# Patient Record
Sex: FEMALE | Race: WHITE | HISPANIC OR LATINO | Employment: FULL TIME | URBAN - METROPOLITAN AREA
[De-identification: names, ages, dates, MRNs, and addresses within clinical notes are randomized per-mention and may not be internally consistent; named-entity substitution may affect disease eponyms.]

---

## 2020-11-17 ENCOUNTER — TELEPHONE (OUTPATIENT)
Dept: OBGYN | Facility: CLINIC | Age: 30
End: 2020-11-17

## 2020-11-17 NOTE — TELEPHONE ENCOUNTER
Called patient to see if she had medical records ready for her appointment because we will be needing them day of because she is transfer of care.

## 2020-11-24 ENCOUNTER — INITIAL PRENATAL (OUTPATIENT)
Dept: OBGYN | Facility: CLINIC | Age: 30
End: 2020-11-24
Payer: COMMERCIAL

## 2020-11-24 ENCOUNTER — HOSPITAL ENCOUNTER (OUTPATIENT)
Facility: MEDICAL CENTER | Age: 30
End: 2020-11-24
Attending: OBSTETRICS & GYNECOLOGY
Payer: COMMERCIAL

## 2020-11-24 ENCOUNTER — HOSPITAL ENCOUNTER (OUTPATIENT)
Dept: RADIOLOGY | Facility: MEDICAL CENTER | Age: 30
End: 2020-11-24
Attending: OBSTETRICS & GYNECOLOGY
Payer: COMMERCIAL

## 2020-11-24 VITALS — SYSTOLIC BLOOD PRESSURE: 100 MMHG | WEIGHT: 174 LBS | DIASTOLIC BLOOD PRESSURE: 66 MMHG | BODY MASS INDEX: 31.83 KG/M2

## 2020-11-24 DIAGNOSIS — O09.92 SUPERVISION OF HIGH RISK PREGNANCY IN SECOND TRIMESTER: ICD-10-CM

## 2020-11-24 PROCEDURE — 59401 PR NEW OB VISIT: CPT | Performed by: OBSTETRICS & GYNECOLOGY

## 2020-11-24 PROCEDURE — 76805 OB US >/= 14 WKS SNGL FETUS: CPT

## 2020-11-24 PROCEDURE — 87591 N.GONORRHOEAE DNA AMP PROB: CPT

## 2020-11-24 PROCEDURE — 87491 CHLMYD TRACH DNA AMP PROBE: CPT

## 2020-11-24 RX ORDER — ALBUTEROL SULFATE 90 UG/1
2 AEROSOL, METERED RESPIRATORY (INHALATION) EVERY 6 HOURS PRN
COMMUNITY

## 2020-11-24 ASSESSMENT — EDINBURGH POSTNATAL DEPRESSION SCALE (EPDS)
I HAVE BEEN SO UNHAPPY THAT I HAVE BEEN CRYING: ONLY OCCASIONALLY
I HAVE BEEN ANXIOUS OR WORRIED FOR NO GOOD REASON: YES, VERY OFTEN
I HAVE FELT SAD OR MISERABLE: YES, QUITE OFTEN
THE THOUGHT OF HARMING MYSELF HAS OCCURRED TO ME: HARDLY EVER
I HAVE BEEN SO UNHAPPY THAT I HAVE HAD DIFFICULTY SLEEPING: NOT VERY OFTEN
TOTAL SCORE: 13
I HAVE BLAMED MYSELF UNNECESSARILY WHEN THINGS WENT WRONG: NOT VERY OFTEN
I HAVE BEEN ABLE TO LAUGH AND SEE THE FUNNY SIDE OF THINGS: AS MUCH AS I ALWAYS COULD
THINGS HAVE BEEN GETTING ON TOP OF ME: YES, SOMETIMES I HAVEN'T BEEN COPING AS WELL AS USUAL
I HAVE LOOKED FORWARD WITH ENJOYMENT TO THINGS: AS MUCH AS I EVER DID
I HAVE FELT SCARED OR PANICKY FOR NO GOOD REASON: YES, SOMETIMES

## 2020-11-24 NOTE — PROGRESS NOTES
"Cc: New OB visit    Ms. Salgado is a 30 y.o.  at 24w4d with  Patient's last menstrual period was 2020. c/w 9w US (in [place].  First knew she was pregnant on  when she missed her menses and started to feel nauseous.  She presents today for her new obstetric visit.  Reports she has been doing well now.    She reports a lot of fetal movement, denies vaginal bleeding, denies leakage of fluid, denies contractions.     She denies nausea/vomiting, headaches, or urinary symptoms.        Dating:LMP c/w outside 9w US    GYN History:  LMP 20. Menarche @11.  Menses irregular, lasting 6-7 days, not particularly heavy.  Last pap , no h/o abnormal pap, nor history of cone biopsy, LEEP or any other cervical, uterine or gynecologic surgery. No history of sexually transmitted diseases.  Has used Depo and Nexplanon in the past    OB History    Para Term  AB Living   2 1 1         SAB TAB Ectopic Molar Multiple Live Births                    # Outcome Date GA Lbr Fredrick/2nd Weight Sex Delivery Anes PTL Lv   2 Current            1 Term 12   3.402 kg (7 lb 8 oz) F CS-Classical Spinal N       Complications: Heart disease     Past Medical History:   Diagnosis Date   • Allergy     seasonal allergys.    • Anemia    • Asthma for \"all her life\", stable on advair and uses rescue albuterol inhaler PRN 10/4/2011    Last Attack, 12yrs old   • Eczema     dx as a child.    • Urinary tract infection, site not specified          Past Surgical History:   Procedure Laterality Date   • OTHER ABDOMINAL SURGERY  2012    lap choly   • HENNY BY LAPAROSCOPY  2012    Performed by ERINN JANG at SURGERY [facility] ORS   • PRIMARY C SECTION  2012    Performed by THALIA JOY at LABOR AND DELIVERY   • EYE SURGERY      7 yrs old, Ear surgery as well at years old     Social History     Socioeconomic History   • Marital status:      Spouse name: Not on file   • Number of children: Not on file   • " Years of education: Not on file   • Highest education level: Not on file   Occupational History   • Not on file   Social Needs   • Financial resource strain: Not on file   • Food insecurity     Worry: Not on file     Inability: Not on file   • Transportation needs     Medical: Not on file     Non-medical: Not on file   Tobacco Use   • Smoking status: Former Smoker     Years: 1.00     Types: Cigarettes     Quit date: 2011     Years since quittin.5   • Tobacco comment: quit, used to smoke about 3 cigs a day.    Substance and Sexual Activity   • Alcohol use: Yes     Comment: on occations partys/ weekends.   • Drug use: No   • Sexual activity: Yes     Partners: Male     Birth control/protection: Injection   Lifestyle   • Physical activity     Days per week: Not on file     Minutes per session: Not on file   • Stress: Not on file   Relationships   • Social connections     Talks on phone: Not on file     Gets together: Not on file     Attends Evangelical service: Not on file     Active member of club or organization: Not on file     Attends meetings of clubs or organizations: Not on file     Relationship status: Not on file   • Intimate partner violence     Fear of current or ex partner: Not on file     Emotionally abused: Not on file     Physically abused: Not on file     Forced sexual activity: Not on file   Other Topics Concern   • Not on file   Social History Narrative   • Not on file     Family History   Problem Relation Age of Onset   • Psychiatric Illness Mother         Bipolar   • Heart Failure Maternal Grandmother      Allergies:   Allergies as of 2020 - Reviewed 2020   Allergen Reaction Noted   • Food  2012   • Pcn [penicillins]  10/04/2011     PE:    /66   Wt 78.9 kg (174 lb)     General: well developed, well nourished in no apparent distress  Head: normocephalic, atraumatic  Neck: neck is supple, non-tender, no thyromegaly, full range of motion  CVS: regular rate and rhythm, 1+  pitting b/l LE peripheral edema  Lungs: Normal respiratory effort. Clear to auscultation bilaterally  Abdomen: Bowel sounds positive, nondistended, soft, nontender x4, no rebound or guarding.  FH 23,   Female GYN: normal female external genitalia without lesionsnormal vagina and normal vaginal tone  Skin: No rashes, or ulcers or lesions seen  Psychiatric: appropriate affect, is alert and oriented x3, intact judgment and insight.    A/P:  30 y.o.  Unknown based upon  Patient's last menstrual period was 2020..  She is here for her new obstetric visit.    No diagnosis found.    #Prenatal care.  Patient was oriented to our obstetric practice and frequency of visits is discussed.  She is encouraged to continue prenatal vitamin use.  We reviewed safe foods and appropraite exercise during pregnancy.  --Prenatal labs anatomy ultrasound is ordered today  #Mild extremity edema in pregnancy.  Discussed mitigating factors and answered questions.  #History of prior  section.  Patient desires repeat section to be scheduled as she does live remote from hospital.  #NOB panel ordered.  #NOB packet given  #SAB precautions reviewed.  #Return to clinic in 4 weeks.  GAEL

## 2020-11-25 ENCOUNTER — TELEPHONE (OUTPATIENT)
Dept: OBGYN | Facility: CLINIC | Age: 30
End: 2020-11-25

## 2020-11-25 DIAGNOSIS — O09.92 SUPERVISION OF HIGH RISK PREGNANCY IN SECOND TRIMESTER: ICD-10-CM

## 2020-11-26 LAB
C TRACH DNA SPEC QL NAA+PROBE: NEGATIVE
N GONORRHOEA DNA SPEC QL NAA+PROBE: NEGATIVE
SPECIMEN SOURCE: NORMAL

## 2020-11-30 DIAGNOSIS — O36.5911 IUGR (INTRAUTERINE GROWTH RESTRICTION) AFFECTING CARE OF MOTHER, FIRST TRIMESTER, FETUS 1: ICD-10-CM

## 2020-11-30 DIAGNOSIS — O36.8990 FETAL PERICARDIAL EFFUSION AFFECTING MANAGEMENT OF MOTHER: ICD-10-CM

## 2020-12-29 ENCOUNTER — ROUTINE PRENATAL (OUTPATIENT)
Dept: OBGYN | Facility: CLINIC | Age: 30
End: 2020-12-29
Payer: COMMERCIAL

## 2020-12-29 VITALS — BODY MASS INDEX: 32.74 KG/M2 | WEIGHT: 179 LBS | SYSTOLIC BLOOD PRESSURE: 109 MMHG | DIASTOLIC BLOOD PRESSURE: 55 MMHG

## 2020-12-29 DIAGNOSIS — O36.5990 POOR FETAL GROWTH AFFECTING MANAGEMENT OF MOTHER, ANTEPARTUM, SINGLE OR UNSPECIFIED FETUS: ICD-10-CM

## 2020-12-29 DIAGNOSIS — O36.5930 POOR FETAL GROWTH AFFECTING MANAGEMENT OF MOTHER IN THIRD TRIMESTER, SINGLE OR UNSPECIFIED FETUS: ICD-10-CM

## 2020-12-29 DIAGNOSIS — O35.BXX0 ANOMALY OF HEART OF FETUS AFFECTING PREGNANCY, ANTEPARTUM, SINGLE OR UNSPECIFIED FETUS: ICD-10-CM

## 2020-12-29 DIAGNOSIS — Z34.90 PREGNANCY, UNSPECIFIED GESTATIONAL AGE: ICD-10-CM

## 2020-12-29 DIAGNOSIS — I31.39 PERICARDIAL EFFUSION: ICD-10-CM

## 2020-12-29 PROCEDURE — 90040 PR PRENATAL FOLLOW UP: CPT | Performed by: OBSTETRICS & GYNECOLOGY

## 2020-12-29 RX ORDER — DIAPER,BRIEF,INFANT-TODD,DISP
EACH MISCELLANEOUS
COMMUNITY
Start: 2020-12-03 | End: 2021-03-03

## 2020-12-29 RX ORDER — NORGESTIMATE AND ETHINYL ESTRADIOL 0.25-0.035
1 KIT ORAL DAILY
COMMUNITY
End: 2021-03-03

## 2020-12-29 RX ORDER — ALBUTEROL SULFATE 90 UG/1
2 AEROSOL, METERED RESPIRATORY (INHALATION)
COMMUNITY
Start: 2020-08-20 | End: 2021-03-03

## 2020-12-29 RX ORDER — ALBUTEROL SULFATE 2.5 MG/3ML
SOLUTION RESPIRATORY (INHALATION)
COMMUNITY
Start: 2020-12-07

## 2020-12-29 NOTE — PROGRESS NOTES
MADI:  29w    Pt reports doing well.  Denies vaginal bleeding, contractions, LOF.  Reports +FM.    /55   Wt 81.2 kg (179 lb)   LMP 2020   BMI 32.74 kg/m²   gen: AAO, NAD  FHTs: 135  FH: 30    A/P: 30 y.o.  @   Transfer of care from [facility].  --Patient lives remote from [place] (4 hours away)  --h/o prior CS, desires scheduled repeat  --don't yet have prenatal labs - calling clinic to again ask they be sent, they have also been reordered but patient hasn't gone to lab  US 2020: Anterior placenta, PINKY 10, cervical length 2.2, normal anatomy except feet not well visualized and small pericardial effusion,  g 9th percentile  [ ] [facility] consultation for echo and repeat ultrasound sent -patient has not yet gone as she lives 4 hours away and therefore it is very difficult for her to have extra appointments - If hasn't had this appt by next appt will order repeat growth US with radiology  --will need NSTs if again FGR seen on US  [x] 3rd tri labs ordered  [ ] tdap/flu refused but she is willing to be reasked

## 2020-12-29 NOTE — PROGRESS NOTES
Pt here today for OB follow up @ 29w4d  Pt states denies VB and LOF  Reports +FM  Good # [phone]  Pharmacy Confirmed.  MAREK sheet given   declined tdap

## 2021-01-20 ENCOUNTER — ROUTINE PRENATAL (OUTPATIENT)
Dept: OBGYN | Facility: CLINIC | Age: 31
End: 2021-01-20
Payer: COMMERCIAL

## 2021-01-20 ENCOUNTER — HOSPITAL ENCOUNTER (OUTPATIENT)
Dept: LAB | Facility: MEDICAL CENTER | Age: 31
End: 2021-01-20
Attending: OBSTETRICS & GYNECOLOGY
Payer: COMMERCIAL

## 2021-01-20 VITALS — BODY MASS INDEX: 34.57 KG/M2 | WEIGHT: 189 LBS | DIASTOLIC BLOOD PRESSURE: 62 MMHG | SYSTOLIC BLOOD PRESSURE: 102 MMHG

## 2021-01-20 DIAGNOSIS — Z98.891 HX OF CESAREAN SECTION: ICD-10-CM

## 2021-01-20 DIAGNOSIS — O36.5930 POOR FETAL GROWTH AFFECTING MANAGEMENT OF MOTHER IN THIRD TRIMESTER, SINGLE OR UNSPECIFIED FETUS: ICD-10-CM

## 2021-01-20 DIAGNOSIS — Z34.90 PREGNANCY, UNSPECIFIED GESTATIONAL AGE: ICD-10-CM

## 2021-01-20 LAB
ERYTHROCYTE [DISTWIDTH] IN BLOOD BY AUTOMATED COUNT: 42.5 FL (ref 35.9–50)
HCT VFR BLD AUTO: 32.2 % (ref 37–47)
HGB BLD-MCNC: 10.2 G/DL (ref 12–16)
MCH RBC QN AUTO: 29.7 PG (ref 27–33)
MCHC RBC AUTO-ENTMCNC: 31.7 G/DL (ref 33.6–35)
MCV RBC AUTO: 93.6 FL (ref 81.4–97.8)
PLATELET # BLD AUTO: 310 K/UL (ref 164–446)
PMV BLD AUTO: 10.9 FL (ref 9–12.9)
RBC # BLD AUTO: 3.44 M/UL (ref 4.2–5.4)
TREPONEMA PALLIDUM IGG+IGM AB [PRESENCE] IN SERUM OR PLASMA BY IMMUNOASSAY: NORMAL
WBC # BLD AUTO: 10.9 K/UL (ref 4.8–10.8)

## 2021-01-20 PROCEDURE — 85027 COMPLETE CBC AUTOMATED: CPT

## 2021-01-20 PROCEDURE — 86780 TREPONEMA PALLIDUM: CPT

## 2021-01-20 PROCEDURE — 90040 PR PRENATAL FOLLOW UP: CPT | Performed by: OBSTETRICS & GYNECOLOGY

## 2021-01-20 PROCEDURE — 82950 GLUCOSE TEST: CPT

## 2021-01-20 PROCEDURE — 36415 COLL VENOUS BLD VENIPUNCTURE: CPT

## 2021-01-20 NOTE — LETTER
January 20, 2021            Shakila Salgado is currently being cared for at [facility].  This patient is pregnant and may continue to work.  However, she has a high risk pregnancy and cannot sit for longer than 2 hours.  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         Thank you,          Cindy Woods D.O.

## 2021-01-20 NOTE — PROGRESS NOTES
Pt here today for OB follow up  Pt states no VB or LOF   Reports +FM   Good # [phone]   Pharmacy Confirmed.

## 2021-01-21 LAB — GLUCOSE 1H P 50 G GLC PO SERPL-MCNC: 78 MG/DL (ref 70–139)

## 2021-01-25 DIAGNOSIS — O99.013 ANEMIA DURING PREGNANCY IN THIRD TRIMESTER: ICD-10-CM

## 2021-01-26 ENCOUNTER — TELEPHONE (OUTPATIENT)
Dept: OBGYN | Facility: CLINIC | Age: 31
End: 2021-01-26

## 2021-01-26 NOTE — TELEPHONE ENCOUNTER
01/26/21 2:19 PM    LM for pt to call back in regards to some additional labs ordered from Dr. Ha.

## 2021-01-27 ENCOUNTER — APPOINTMENT (OUTPATIENT)
Dept: RADIOLOGY | Facility: MEDICAL CENTER | Age: 31
End: 2021-01-27
Attending: OBSTETRICS & GYNECOLOGY
Payer: COMMERCIAL

## 2021-01-27 NOTE — TELEPHONE ENCOUNTER
Pt called back regarding voicemail. Informed Pt that the voicemail was that  added other labs for her to get done. Pt wanted to know why. Informed PT that it is additional labs due to low iron count from previous labs. Pt understood. Pt also mentioned she would just like to be seen at [address] Pt informed it is nessary to be seen at [facility] as well. Pt understood no further questions asked. Transferred to  for scheduling

## 2021-01-28 ENCOUNTER — ROUTINE PRENATAL (OUTPATIENT)
Dept: OBGYN | Facility: CLINIC | Age: 31
End: 2021-01-28
Payer: COMMERCIAL

## 2021-01-28 ENCOUNTER — HOSPITAL ENCOUNTER (OUTPATIENT)
Dept: RADIOLOGY | Facility: MEDICAL CENTER | Age: 31
End: 2021-01-28
Attending: OBSTETRICS & GYNECOLOGY
Payer: COMMERCIAL

## 2021-01-28 ENCOUNTER — HOSPITAL ENCOUNTER (OUTPATIENT)
Facility: MEDICAL CENTER | Age: 31
End: 2021-01-28
Attending: OBSTETRICS & GYNECOLOGY | Admitting: OBSTETRICS & GYNECOLOGY
Payer: COMMERCIAL

## 2021-01-28 VITALS — BODY MASS INDEX: 31.49 KG/M2 | HEIGHT: 65 IN | WEIGHT: 189 LBS

## 2021-01-28 DIAGNOSIS — O36.5930 POOR FETAL GROWTH AFFECTING MANAGEMENT OF MOTHER IN THIRD TRIMESTER, SINGLE OR UNSPECIFIED FETUS: ICD-10-CM

## 2021-01-28 DIAGNOSIS — O36.5930 POOR FETAL GROWTH AFFECTING MANAGEMENT OF MOTHER IN THIRD TRIMESTER, SINGLE OR UNSPECIFIED FETUS: Primary | ICD-10-CM

## 2021-01-28 PROCEDURE — 76816 OB US FOLLOW-UP PER FETUS: CPT

## 2021-01-28 PROCEDURE — 99213 OFFICE O/P EST LOW 20 MIN: CPT | Performed by: OBSTETRICS & GYNECOLOGY

## 2021-01-28 PROCEDURE — 59025 FETAL NON-STRESS TEST: CPT | Performed by: OBSTETRICS & GYNECOLOGY

## 2021-01-28 PROCEDURE — 90040 PR PRENATAL FOLLOW UP: CPT | Mod: 25 | Performed by: OBSTETRICS & GYNECOLOGY

## 2021-01-28 PROCEDURE — 700111 HCHG RX REV CODE 636 W/ 250 OVERRIDE (IP): Performed by: OBSTETRICS & GYNECOLOGY

## 2021-01-28 PROCEDURE — 96372 THER/PROPH/DIAG INJ SC/IM: CPT

## 2021-01-28 RX ORDER — BETAMETHASONE SODIUM PHOSPHATE AND BETAMETHASONE ACETATE 3; 3 MG/ML; MG/ML
12 INJECTION, SUSPENSION INTRA-ARTICULAR; INTRALESIONAL; INTRAMUSCULAR; SOFT TISSUE ONCE
Qty: 2 ML | Refills: 0 | Status: SHIPPED | OUTPATIENT
Start: 2021-01-28 | End: 2021-01-28

## 2021-01-28 RX ORDER — BETAMETHASONE SODIUM PHOSPHATE AND BETAMETHASONE ACETATE 3; 3 MG/ML; MG/ML
12 INJECTION, SUSPENSION INTRA-ARTICULAR; INTRALESIONAL; INTRAMUSCULAR; SOFT TISSUE ONCE
Status: COMPLETED | OUTPATIENT
Start: 2021-01-28 | End: 2021-01-28

## 2021-01-28 RX ADMIN — BETAMETHASONE ACETATE AND BETAMETHASONE SODIUM PHOSPHATE 12 MG: 3; 3 INJECTION, SUSPENSION INTRA-ARTICULAR; INTRALESIONAL; INTRAMUSCULAR; SOFT TISSUE at 17:48

## 2021-01-28 NOTE — PROGRESS NOTES
S: Pt presents for routine OB follow up.  No contractions, vaginal bleeding, or leaking fluids. Good fetal movement.    Questions answered.    O: /62   Wt 85.7 kg (189 lb)   LMP 2020   BMI 34.57 kg/m²   Patients' weight gain, fluid intake and exercise level discussed.  Vitals, fundal height , fetal position, and FHR reviewed on flowsheet    Lab:  Recent Results (from the past 336 hour(s))   CBC WITHOUT DIFFERENTIAL    Collection Time: 21  3:19 PM   Result Value Ref Range    WBC 10.9 (H) 4.8 - 10.8 K/uL    RBC 3.44 (L) 4.20 - 5.40 M/uL    Hemoglobin 10.2 (L) 12.0 - 16.0 g/dL    Hematocrit 32.2 (L) 37.0 - 47.0 %    MCV 93.6 81.4 - 97.8 fL    MCH 29.7 27.0 - 33.0 pg    MCHC 31.7 (L) 33.6 - 35.0 g/dL    RDW 42.5 35.9 - 50.0 fL    Platelet Count 310 164 - 446 K/uL    MPV 10.9 9.0 - 12.9 fL   T.PALLIDUM AB EIA    Collection Time: 21  3:19 PM   Result Value Ref Range    Syphilis, Treponemal Qual Non-Reactive Non-Reactive   GLUCOSE 1HR GESTATIONAL    Collection Time: 21  3:20 PM   Result Value Ref Range    Glucose, Post Dose 78 70 - 139 mg/dL       A/P:  30 y.o.  at 33w6d presents for routine obstetric follow-up.  Size less than dates    Transfer of care from [facility].  --Patient lives remote from [place] (4 hours away)  --h/o prior CS, desires scheduled repeat possible BTL  --don't yet have prenatal labs - calling clinic to again ask they be sent, they have also been reordered but patient hasn't gone to lab  US 2020: Anterior placenta, PINKY 10, cervical length 2.2, normal anatomy except feet not well visualized and small pericardial effusion,  g 9th percentile  [ ] [facility] consultation for echo and repeat ultrasound sent -patient has not yet gone as she lives 4 hours away and therefore it is very difficult for her to have extra appointments - If hasn't had this appt by next appt will order repeat growth US with radiology   Second referral sent for [facility]   will need  NSTs if again FGR seen on US  [x] 3rd tri labs ordered  [ ] tdap/flu refused but she is willing to be reasked

## 2021-01-29 ENCOUNTER — TELEPHONE (OUTPATIENT)
Dept: OBGYN | Facility: CLINIC | Age: 31
End: 2021-01-29

## 2021-01-29 NOTE — PROGRESS NOTES
Pt presents to L&D for betamethasone injection. Pt was seen in office today for an NST and ultrasound. Dr. Ha aware of pt's arrival, do not need to put pt on monitor, can order injection and then discharge home    Betamethasone administered. Pt discharged home, ambulatory and undelivered. Provided general instructions, PTL precautions and kick count instruction

## 2021-01-29 NOTE — ED PROVIDER NOTES
"LABOR AND DELIVERY OB ED NOTE    PATIENT ID:  NAME:  Shakila Salgado  MRN:               [id number]  YOB: 1990    CC:  DAPHNE    HPI:  Shakila Salgado is a 30 y.o. female  at 33w6d who has come here to day for betamethasone injection.  Her pregnancy has been complicated by IUGR.  She has follow up with MFM next week for further evaluation.  She reports she is concerned about her baby but otherwise is doing well.  Denies contractions, LOF, vaginal bleeding and endorses good FM.     ROS: Patient denies any fever chills, nausea, vomiting, headache, chest pain, shortness of breath, or dysuria or unusual swelling of hands or feet.     No results for input(s): WBC, RBC, HEMOGLOBIN, HEMATOCRIT, MCV, MCH, RDW, PLATELETCT, MPV, NEUTSPOLYS, LYMPHOCYTES, MONOCYTES, EOSINOPHILS, BASOPHILS, RBCMORPHOLO in the last 72 hours.  No results for input(s): SODIUM, POTASSIUM, CHLORIDE, CO2, GLUCOSE, BUN, CPKTOTAL in the last 72 hours.    POB Hx:  OB History    Para Term  AB Living   2 1 1 0 0 1   SAB TAB Ectopic Molar Multiple Live Births   0 0 0 0 0 1      # Outcome Date GA Lbr Fredrick/2nd Weight Sex Delivery Anes PTL Lv   2 Current            1 Term 12   3.402 kg (7 lb 8 oz) F CS-LTranv Spinal N GARY      Complications: Heart disease, Breech presentation     PMH/Problem List:    Past Medical History:   Diagnosis Date   • Allergy     seasonal allergys.    • Anemia    • Asthma for \"all her life\", stable on advair and uses rescue albuterol inhaler PRN 10/4/2011    Last Attack, 12yrs old   • Eczema     dx as a child.    • Urinary tract infection, site not specified          Patient Active Problem List    Diagnosis Date Noted   • Poor fetal growth affecting management of mother in third trimester 2021   • Hx of  section 2021   • Breech presentation,  section on 2012   • Pericardial effusion- fetal 2012   • Asthma for \"all her life\", stable on advair and " "uses rescue albuterol inhaler PRN 10/04/2011     Current Outpatient Medications:  No current facility-administered medications on file prior to encounter.      Current Outpatient Medications on File Prior to Encounter   Medication Sig Dispense Refill   • betamethasone acetate-betamethasone sodium phosphate (CELESTONE) 6 (3-3) MG/ML Suspension Inject 2 mL into the shoulder, thigh, or buttocks one time for 1 dose. 2 mL 0   • LEVALBUTEROL HCL INH Inhale.     • Prenatal Vit-Fe Fumarate-FA (PRENATAL 1+1 PO) Take 1 Cap by mouth every day.     • diphenhydramine (SOMINEX) 25 MG tablet Take 25 mg by mouth.     • hydrocortisone 0.5 % Cream THEIRRY EXT AA BID FOR 14 DAYS     • norgestimate-ethinyl estradiol (ORTHO-CYCLEN) 0.25-35 MG-MCG per tablet Take 1 Tab by mouth every day.     • albuterol (PROVENTIL) 2.5mg/3ml Nebu Soln solution for nebulization USE 3 ML VIA NEBULIZER EVERY 6 TO 8 HOURS AS NEEDED     • albuterol 108 (90 Base) MCG/ACT Aero Soln inhalation aerosol Inhale 2 Puffs.     • albuterol 108 (90 Base) MCG/ACT Aero Soln inhalation aerosol Inhale 2 Puffs every 6 hours as needed for Shortness of Breath.     • Prenatal Vit-Fe Fumarate-FA (PRENATAL 1+1 PO) Take  by mouth.     • Levalbuterol HCl (XOPENEX CONCENTRATE INH) Inhale  by mouth.         PSH:    Past Surgical History:   Procedure Laterality Date   • OTHER ABDOMINAL SURGERY  5/30/2012    lap choly   • HENNY BY LAPAROSCOPY  5/30/2012    Performed by ERINN JANG at SURGERY [facility]   • PRIMARY C SECTION  2/2/2012    Performed by THALIA JOY at LABOR AND DELIVERY   • EYE SURGERY      7 yrs old, Ear surgery as well at years old       Allergies:   Allergies   Allergen Reactions   • Penicillins Hives and Rash   • Food      \"Peanuts and tree nuts\"       SH:  Social History     Socioeconomic History   • Marital status:      Spouse name: Not on file   • Number of children: Not on file   • Years of education: Not on file   • Highest education level: Not on " "file   Occupational History   • Not on file   Social Needs   • Financial resource strain: Not on file   • Food insecurity     Worry: Not on file     Inability: Not on file   • Transportation needs     Medical: Not on file     Non-medical: Not on file   Tobacco Use   • Smoking status: Former Smoker     Years: 1.00     Types: Cigarettes     Quit date: 2011     Years since quittin.7   • Tobacco comment: quit, used to smoke about 3 cigs a day.    Substance and Sexual Activity   • Alcohol use: Yes     Comment: on occations partys/ weekends.   • Drug use: No   • Sexual activity: Yes     Partners: Male     Birth control/protection: Injection   Lifestyle   • Physical activity     Days per week: Not on file     Minutes per session: Not on file   • Stress: Not on file   Relationships   • Social connections     Talks on phone: Not on file     Gets together: Not on file     Attends Hoahaoism service: Not on file     Active member of club or organization: Not on file     Attends meetings of clubs or organizations: Not on file     Relationship status: Not on file   • Intimate partner violence     Fear of current or ex partner: Not on file     Emotionally abused: Not on file     Physically abused: Not on file     Forced sexual activity: Not on file   Other Topics Concern   • Not on file   Social History Narrative   • Not on file         PHYSICAL EXAM:  Vitals:    21 1716   Weight: 85.7 kg (189 lb)   Height: 1.651 m (5' 5\")     No data recorded.    General: No acute distress, resting comfortably in bed.  HEENT: normocephalic, nontraumatic, PERRLA, EOMI  Cardiovascular: Heart RRR with no murmurs, rubs or gallops. Distal Pulses 2+  Respiratory: symmetric chest expansion, lungs CTA bilaterally with no wheezes rales or  rhonci  Abdomen: gravid, nontender  Musculoskeletal: strength 5/5 in four extremities  Neuro: non focal with no numbness, tingling or changes in sensation    A/P:  Shakila Salgado is a 30 y.o.  33w6d " with IUGR  #IUGR.   Discussed this diagnosis further with Shakila and her partner today.  Given likely early delivery patient was counseled about BMZ to decrease  morbidity.  She agrees and is given this injection today.  Will get next dose tomorrow closer to her home or will return here.  Understands she needs to have 2nd dose 24hrs after first. :   # Precautions and FKC reviewed.  # To return with increased frequency/intensity UCs, LOF, VB, or decreased FM.    Discharged home in stable condition.  LKB    Evaluation and clinical decision making, including analysis of Fetal data and maternal lab work completed over a 20 minute period.     Awa Ha D.O.

## 2021-01-29 NOTE — PROGRESS NOTES
Shakila Salgado, a  at 34w0d with an BRENDAN of 3/12/2021, by Last Menstrual Period, was seen at [facility] for a nonstress test.    Nonstress Test  Reason for NST: Intrauterine growth restriction  Variability: Moderate  Decelerations: None  Accelerations: Yes  Baseline: 120  Uterine Irritability: No  Contractions: Not present  Nonstress Test Interpretation  Comments: Reactive NST, Cat I    Long discussion with patient.  She will go to labor and delivery today for beta and f/u tomorrow for second dose.  A paper Rx was given to the patient if she is able to do the steroid shot at a closer hospital, but explained it is critical she has them done and recommend coming to [facility] as we know her pregnancy.  Pt will stay in town to be able to make her twice weekly appts.  Is going to see [facility] on  and will get doppler and PINKY done there weekly and NST with us weekly.  If any concerns arise, will deliver with the recommendations of [facility].  Patient and  understand the guarded nature of the pregnancy and agree to f/u as scheduled.     Cindy Woods D.O.

## 2021-02-02 ENCOUNTER — ROUTINE PRENATAL (OUTPATIENT)
Dept: OBGYN | Facility: CLINIC | Age: 31
End: 2021-02-02
Payer: COMMERCIAL

## 2021-02-02 ENCOUNTER — APPOINTMENT (OUTPATIENT)
Dept: OBGYN | Facility: CLINIC | Age: 31
End: 2021-02-02
Payer: COMMERCIAL

## 2021-02-02 VITALS — BODY MASS INDEX: 32.45 KG/M2 | WEIGHT: 195 LBS | SYSTOLIC BLOOD PRESSURE: 109 MMHG | DIASTOLIC BLOOD PRESSURE: 66 MMHG

## 2021-02-02 DIAGNOSIS — O36.5930 POOR FETAL GROWTH AFFECTING MANAGEMENT OF MOTHER IN THIRD TRIMESTER, SINGLE OR UNSPECIFIED FETUS: ICD-10-CM

## 2021-02-02 LAB
NST ACOUSTIC STIMULATION: NORMAL
NST ACTION NECESSARY: NORMAL
NST ASSESSMENT: NORMAL
NST BASELINE: NORMAL
NST INDICATIONS: NORMAL
NST OTHER DATA: NORMAL
NST READ BY: NORMAL
NST RETURN: NORMAL
NST UTERINE ACTIVITY: NORMAL

## 2021-02-02 PROCEDURE — 90040 PR PRENATAL FOLLOW UP: CPT | Performed by: OBSTETRICS & GYNECOLOGY

## 2021-02-02 PROCEDURE — 59025 FETAL NON-STRESS TEST: CPT | Performed by: OBSTETRICS & GYNECOLOGY

## 2021-02-02 NOTE — NON-PROVIDER
OB follow up   + fetal movement.  No VB, LOF or UC's.  Flu vaccine offered, declined previously  Phone # [phone]  Preferred pharmacy confirmed.  C/o cramping

## 2021-02-02 NOTE — PROGRESS NOTES
S: Pt presents for routine OB follow up.  No contractions, vaginal bleeding, or leaking fluids. Good fetal movement.    Questions answered.    O: /66   Wt 88.5 kg (195 lb)   LMP 2020   BMI 32.45 kg/m²   Patients' weight gain, fluid intake and exercise level discussed.  Vitals, fundal height , fetal position, and FHR reviewed on flowsheet    Lab:  Recent Results (from the past 336 hour(s))   CBC WITHOUT DIFFERENTIAL    Collection Time: 21  3:19 PM   Result Value Ref Range    WBC 10.9 (H) 4.8 - 10.8 K/uL    RBC 3.44 (L) 4.20 - 5.40 M/uL    Hemoglobin 10.2 (L) 12.0 - 16.0 g/dL    Hematocrit 32.2 (L) 37.0 - 47.0 %    MCV 93.6 81.4 - 97.8 fL    MCH 29.7 27.0 - 33.0 pg    MCHC 31.7 (L) 33.6 - 35.0 g/dL    RDW 42.5 35.9 - 50.0 fL    Platelet Count 310 164 - 446 K/uL    MPV 10.9 9.0 - 12.9 fL   T.PALLIDUM AB EIA    Collection Time: 21  3:19 PM   Result Value Ref Range    Syphilis, Treponemal Qual Non-Reactive Non-Reactive   GLUCOSE 1HR GESTATIONAL    Collection Time: 21  3:20 PM   Result Value Ref Range    Glucose, Post Dose 78 70 - 139 mg/dL   POCT Fetal Nonstress Test    Collection Time: 21  1:11 PM   Result Value Ref Range    NST Indications      NST Baseline      NST Uterine Activity      NST Acoustic Stimulation      NST Assessment      NST Action Necessary      NST Other Data      NST Return      NST Read By       Indication for NST: IUGR    NST performed and per my read:    Reactive NST, cat. 1  TOCO monitor: no CTXs noted      A/P:  30 y.o.  at 34w4d presents for routine obstetric follow-up.    Transfer of care from [facility].  --Patient lives remote from [place] (4 hours away)  --h/o prior CS, desires scheduled repeat with BTL  --don't yet have prenatal labs from [facility] - called clinic to again but not received, they have also been reordered but patient hasn't gone to lab  US 2020: Anterior placenta, PINKY 10, cervical length 2.2, normal anatomy except feet not  well visualized and small pericardial effusion,  g 9th percentile  --severe IUGR AC 1%, EFW 4%   [ ] [facility] consultation for echo and repeat ultrasound sent -patient has not yet gone as she lives 4 hours away-->scheduled on 2/5 with [facility]   [x]Beta 1/28-1/29  [x] 3rd tri labs wnl  [ ] tdap/flu refused but she is willing to be reasked

## 2021-02-02 NOTE — LETTER
[facility]  [facility]  [id number]     2021    Patient: Shakila Salgado   YOB: 1990   Date of Visit: 2021       To Whom It May Concern:    Shakila Salgado was seen and treated in our department on 2021. Her mother, Yady Kellogg, is her primary care giver.  Shakila has a high risk pregnancy and will need sufficient support after delivery of her child.  Please allow Yady 2 weeks to help our patient manage her recovery and care for the  child.             Sincerely,     Cindy Woods D.O.   (electronically signed)

## 2021-02-16 ENCOUNTER — HOSPITAL ENCOUNTER (OUTPATIENT)
Facility: MEDICAL CENTER | Age: 31
End: 2021-02-16
Attending: OBSTETRICS & GYNECOLOGY
Payer: COMMERCIAL

## 2021-02-16 ENCOUNTER — ROUTINE PRENATAL (OUTPATIENT)
Dept: OBGYN | Facility: CLINIC | Age: 31
End: 2021-02-16
Payer: COMMERCIAL

## 2021-02-16 VITALS — DIASTOLIC BLOOD PRESSURE: 71 MMHG | WEIGHT: 199 LBS | SYSTOLIC BLOOD PRESSURE: 117 MMHG | BODY MASS INDEX: 33.12 KG/M2

## 2021-02-16 DIAGNOSIS — Z34.83 ENCOUNTER FOR SUPERVISION OF OTHER NORMAL PREGNANCY IN THIRD TRIMESTER: ICD-10-CM

## 2021-02-16 PROCEDURE — 87150 DNA/RNA AMPLIFIED PROBE: CPT

## 2021-02-16 PROCEDURE — 87081 CULTURE SCREEN ONLY: CPT

## 2021-02-16 PROCEDURE — 90040 PR PRENATAL FOLLOW UP: CPT | Performed by: OBSTETRICS & GYNECOLOGY

## 2021-02-16 NOTE — PROGRESS NOTES
MADI:  36w4d    Pt reports doing well.  Denies vaginal bleeding, contractions, LOF.  Reports +FM.    /71   Wt 90.3 kg (199 lb)   LMP 2020   BMI 33.12 kg/m²   gen: AAO, NAD  FHTs: 130  FH: 34    A/P: 30 y.o.  @ 36w4d      Transfer of care from [facility].  --Patient lives remote from [place] (4 hours away)  --h/o prior CS, desires scheduled repeat with BTL  --Rh+/-, RI, pnl wnl    US 2020: Anterior placenta, PINKY 10, cervical length 2.2, normal anatomy except feet not well visualized and small pericardial effusion,  g 9th percentile  --severe IUGR AC 1%, EFW 4%   [ ] [facility] consultation for echo and repeat ultrasound sent -patient has not yet gone as she lives 4 hours away-->scheduled on  with [facility]: normal growth, EFW 22%, AC 25%; further US as clinically indicated; recommend  ECHO.   S/p BMZ -  [x] 3rd tri labs wnl  [ ] tdap/flu refused        Plan for c/s w/ desired sterilization 39wks - desires Dr. Woods or Dilcia.      RTC 1wks    Tita Padron MD  [facility]

## 2021-02-16 NOTE — PROGRESS NOTES
Pt here today for OB follow up @ 36w4d  Pt states denies VB and LOF  Reports +FM  Good # [phone]   Pharmacy Confirmed.  Chaperone offered and provided.   GBS today   Declined Tdap and flu vaccine previously

## 2021-02-18 LAB — GP B STREP DNA SPEC QL NAA+PROBE: NEGATIVE

## 2021-02-25 ENCOUNTER — ROUTINE PRENATAL (OUTPATIENT)
Dept: OBGYN | Facility: CLINIC | Age: 31
End: 2021-02-25
Payer: COMMERCIAL

## 2021-02-25 VITALS — DIASTOLIC BLOOD PRESSURE: 68 MMHG | BODY MASS INDEX: 33.12 KG/M2 | WEIGHT: 199 LBS | SYSTOLIC BLOOD PRESSURE: 115 MMHG

## 2021-02-25 DIAGNOSIS — Z98.891 HX OF CESAREAN SECTION: ICD-10-CM

## 2021-02-25 PROCEDURE — 90040 PR PRENATAL FOLLOW UP: CPT | Performed by: OBSTETRICS & GYNECOLOGY

## 2021-02-25 NOTE — PROGRESS NOTES
"S: Pt presents for routine OB follow up.  No contractions, vaginal bleeding, or leaking fluids. Good fetal movement.  Has questions about circumcision, and her up coming  section with sterilization.     O: /68   Wt 90.3 kg (199 lb)   LMP 2020   BMI 33.12 kg/m²   Vitals:    21 1015   BP: 115/68   Weight: 90.3 kg (199 lb)     Vitals, fundal height , fetal position, and FHR reviewed on flowsheet    Patient Active Problem List   Diagnosis   • Asthma for \"all her life\", stable on advair and uses rescue albuterol inhaler PRN   • Pericardial effusion- fetal   • Breech presentation,  section on 2012   • Poor fetal growth affecting management of mother in third trimester   • Hx of  section       A/P:  30 y.o.  at 37w6d presents for routine obstetric follow-up.     #Prenatal care  - Continue prenatal vitamins.  Transfer of care from [facility].  --Patient lives remote from [place] (4 hours away)  --h/o prior CS, desires scheduled repeat with BTL  --Rh+/-, RI, pnl wnl    US 2020: Anterior placenta, PINKY 10, cervical length 2.2, normal anatomy except feet not well visualized and small pericardial effusion,  g 9th percentile  --severe IUGR AC 1%, EFW 4%   [ ] [facility] consultation for echo and repeat ultrasound sent -patient has not yet gone as she lives 4 hours away-->scheduled on  with [facility]: normal growth, EFW 22%, AC 25%; further US as clinically indicated; recommend  ECHO.   S/p BMZ -  [x] 3rd tri labs wnl  [ ] tdap/flu refused    Repeat c/s scheduled for 3/7/2021  We discussed that salpingectomy is a permanent form of sterilization.  We reviewed the anatomy and the reason why we perform salpingectomy as opposed to bilateral tubal ligation.  She may discuss this further at her preoperative visit    - Follow-up: 1 week with surgeon for preoperative appt.     "

## 2021-02-25 NOTE — NON-PROVIDER
OB follow up   + fetal movement.  No VB, LOF or UC's.  Flu vaccine offered, declined previously  Phone # [phone]  Preferred pharmacy confirmed.  C/s scheduled for 03/07/2021  GBS negative

## 2021-03-03 ENCOUNTER — PRE-ADMISSION TESTING (OUTPATIENT)
Dept: ADMISSIONS | Facility: MEDICAL CENTER | Age: 31
End: 2021-03-03
Attending: OBSTETRICS & GYNECOLOGY
Payer: COMMERCIAL

## 2021-03-03 ENCOUNTER — ROUTINE PRENATAL (OUTPATIENT)
Dept: OBGYN | Facility: CLINIC | Age: 31
End: 2021-03-03
Payer: COMMERCIAL

## 2021-03-03 VITALS — SYSTOLIC BLOOD PRESSURE: 117 MMHG | WEIGHT: 202 LBS | DIASTOLIC BLOOD PRESSURE: 78 MMHG | BODY MASS INDEX: 33.61 KG/M2

## 2021-03-03 DIAGNOSIS — O09.93 HIGH-RISK PREGNANCY IN THIRD TRIMESTER: ICD-10-CM

## 2021-03-03 PROCEDURE — 90040 PR PRENATAL FOLLOW UP: CPT | Performed by: OBSTETRICS & GYNECOLOGY

## 2021-03-03 NOTE — PROGRESS NOTES
OB follow up   + fetal movement.  No VB, LOF or UC's.  Flu and Tdap declined  Phone #   Preferred pharmacy confirmed.  Pt states she is her for a pre op for her scheduled repeat  2021  Pt would also like to discuss her paper work for short term disability  And would like to decline hep b vaccine for baby at hospital

## 2021-03-03 NOTE — PROGRESS NOTES
S: Pt presents for routine OB follow up.  No contractions, vaginal bleeding, or leaking fluids. Good fetal movement.    Questions answered.    O: /78 (BP Location: Right arm, Patient Position: Sitting, BP Cuff Size: Adult)   Wt 91.6 kg (202 lb)   LMP 2020   BMI 33.61 kg/m²   Patients' weight gain, fluid intake and exercise level discussed.  Vitals, fundal height , fetal position, and FHR reviewed on flowsheet    Lab:No results found for this or any previous visit (from the past 336 hour(s)).    A/P:  30 y.o.  at 38w5d presents for routine obstetric follow-up.  Size equals dates and/or scan    Transfer of care from [facility].  --Patient lives remote from [place] (4 hours away)  --h/o prior CS, desires scheduled repeat with BTL  --Rh+/-, RI, pnl wnl    US 2020: Anterior placenta, PINKY 10, cervical length 2.2, normal anatomy except feet not well visualized and small pericardial effusion,  g 9th percentile  --severe IUGR AC 1%, EFW 4%   [ ] [facility] consultation for echo and repeat ultrasound sent -patient has not yet gone as she lives 4 hours away-->scheduled on  with [facility]: normal growth, EFW 22%, AC 25%; further US as clinically indicated; recommend  ECHO.   S/p BMZ -  [x] 3rd tri labs wnl  [ ] tdap/flu refused    Repeat c/s scheduled for 3/7/2021

## 2021-03-04 ENCOUNTER — HOSPITAL ENCOUNTER (OUTPATIENT)
Dept: LAB | Facility: MEDICAL CENTER | Age: 31
End: 2021-03-04
Attending: OBSTETRICS & GYNECOLOGY
Payer: COMMERCIAL

## 2021-03-04 DIAGNOSIS — O09.93 HIGH-RISK PREGNANCY IN THIRD TRIMESTER: ICD-10-CM

## 2021-03-04 LAB
ALT SERPL-CCNC: 9 U/L (ref 2–50)
AST SERPL-CCNC: 16 U/L (ref 12–45)
CREAT SERPL-MCNC: 0.61 MG/DL (ref 0.5–1.4)
CREAT UR-MCNC: 311.99 MG/DL
ERYTHROCYTE [DISTWIDTH] IN BLOOD BY AUTOMATED COUNT: 49.1 FL (ref 35.9–50)
HCT VFR BLD AUTO: 36.1 % (ref 37–47)
HGB BLD-MCNC: 11.7 G/DL (ref 12–16)
MCH RBC QN AUTO: 30.2 PG (ref 27–33)
MCHC RBC AUTO-ENTMCNC: 32.4 G/DL (ref 33.6–35)
MCV RBC AUTO: 93 FL (ref 81.4–97.8)
PLATELET # BLD AUTO: 268 K/UL (ref 164–446)
PMV BLD AUTO: 10.5 FL (ref 9–12.9)
PROT UR-MCNC: 49 MG/DL (ref 0–15)
PROT/CREAT UR: 157 MG/G (ref 10–107)
RBC # BLD AUTO: 3.88 M/UL (ref 4.2–5.4)
WBC # BLD AUTO: 10.1 K/UL (ref 4.8–10.8)

## 2021-03-04 PROCEDURE — 82570 ASSAY OF URINE CREATININE: CPT

## 2021-03-04 PROCEDURE — 84460 ALANINE AMINO (ALT) (SGPT): CPT

## 2021-03-04 PROCEDURE — 85027 COMPLETE CBC AUTOMATED: CPT

## 2021-03-04 PROCEDURE — 82565 ASSAY OF CREATININE: CPT

## 2021-03-04 PROCEDURE — 36415 COLL VENOUS BLD VENIPUNCTURE: CPT

## 2021-03-04 PROCEDURE — 84450 TRANSFERASE (AST) (SGOT): CPT

## 2021-03-04 PROCEDURE — 84156 ASSAY OF PROTEIN URINE: CPT

## 2021-03-07 ENCOUNTER — ANESTHESIA EVENT (OUTPATIENT)
Dept: OBGYN | Facility: MEDICAL CENTER | Age: 31
End: 2021-03-07
Payer: COMMERCIAL

## 2021-03-07 ENCOUNTER — ANESTHESIA (OUTPATIENT)
Dept: OBGYN | Facility: MEDICAL CENTER | Age: 31
End: 2021-03-07
Payer: COMMERCIAL

## 2021-03-07 ENCOUNTER — HOSPITAL ENCOUNTER (INPATIENT)
Facility: MEDICAL CENTER | Age: 31
LOS: 3 days | End: 2021-03-10
Attending: OBSTETRICS & GYNECOLOGY | Admitting: OBSTETRICS & GYNECOLOGY
Payer: COMMERCIAL

## 2021-03-07 DIAGNOSIS — Z98.891 S/P CESAREAN SECTION: ICD-10-CM

## 2021-03-07 LAB
ABO + RH BLD: NORMAL
ABO GROUP BLD: NORMAL
BASOPHILS # BLD AUTO: 0.4 % (ref 0–1.8)
BASOPHILS # BLD: 0.04 K/UL (ref 0–0.12)
BLD GP AB SCN SERPL QL: NORMAL
EOSINOPHIL # BLD AUTO: 0.28 K/UL (ref 0–0.51)
EOSINOPHIL NFR BLD: 2.9 % (ref 0–6.9)
ERYTHROCYTE [DISTWIDTH] IN BLOOD BY AUTOMATED COUNT: 47.4 FL (ref 35.9–50)
ERYTHROCYTE [DISTWIDTH] IN BLOOD BY AUTOMATED COUNT: 49.4 FL (ref 35.9–50)
HBV SURFACE AG SERPL QL IA: NEGATIVE
HCT VFR BLD AUTO: 30.2 % (ref 37–47)
HCT VFR BLD AUTO: 35.6 % (ref 37–47)
HGB BLD-MCNC: 11.4 G/DL (ref 12–16)
HGB BLD-MCNC: 9.6 G/DL (ref 12–16)
HOLDING TUBE BB 8507: NORMAL
IMM GRANULOCYTES # BLD AUTO: 0.05 K/UL (ref 0–0.11)
IMM GRANULOCYTES NFR BLD AUTO: 0.5 % (ref 0–0.9)
LYMPHOCYTES # BLD AUTO: 3.33 K/UL (ref 1–4.8)
LYMPHOCYTES NFR BLD: 34.8 % (ref 22–41)
MCH RBC QN AUTO: 29.3 PG (ref 27–33)
MCH RBC QN AUTO: 30 PG (ref 27–33)
MCHC RBC AUTO-ENTMCNC: 31.8 G/DL (ref 33.6–35)
MCHC RBC AUTO-ENTMCNC: 32 G/DL (ref 33.6–35)
MCV RBC AUTO: 91.5 FL (ref 81.4–97.8)
MCV RBC AUTO: 94.4 FL (ref 81.4–97.8)
MONOCYTES # BLD AUTO: 0.88 K/UL (ref 0–0.85)
MONOCYTES NFR BLD AUTO: 9.2 % (ref 0–13.4)
NEUTROPHILS # BLD AUTO: 4.99 K/UL (ref 2–7.15)
NEUTROPHILS NFR BLD: 52.2 % (ref 44–72)
NRBC # BLD AUTO: 0 K/UL
NRBC BLD-RTO: 0 /100 WBC
PLATELET # BLD AUTO: 249 K/UL (ref 164–446)
PLATELET # BLD AUTO: 270 K/UL (ref 164–446)
PMV BLD AUTO: 10.9 FL (ref 9–12.9)
PMV BLD AUTO: 11.1 FL (ref 9–12.9)
RBC # BLD AUTO: 3.2 M/UL (ref 4.2–5.4)
RBC # BLD AUTO: 3.89 M/UL (ref 4.2–5.4)
RH BLD: NORMAL
RUBV IGG SERPL IA-ACNC: NORMAL
SARS-COV+SARS-COV-2 AG RESP QL IA.RAPID: NOTDETECTED
SARS-COV-2 RNA RESP QL NAA+PROBE: NOTDETECTED
SPECIMEN SOURCE: NORMAL
SPECIMEN SOURCE: NORMAL
WBC # BLD AUTO: 15.8 K/UL (ref 4.8–10.8)
WBC # BLD AUTO: 9.6 K/UL (ref 4.8–10.8)

## 2021-03-07 PROCEDURE — U0003 INFECTIOUS AGENT DETECTION BY NUCLEIC ACID (DNA OR RNA); SEVERE ACUTE RESPIRATORY SYNDROME CORONAVIRUS 2 (SARS-COV-2) (CORONAVIRUS DISEASE [COVID-19]), AMPLIFIED PROBE TECHNIQUE, MAKING USE OF HIGH THROUGHPUT TECHNOLOGIES AS DESCRIBED BY CMS-2020-01-R: HCPCS

## 2021-03-07 PROCEDURE — U0005 INFEC AGEN DETEC AMPLI PROBE: HCPCS

## 2021-03-07 PROCEDURE — 160009 HCHG ANES TIME/MIN: Performed by: OBSTETRICS & GYNECOLOGY

## 2021-03-07 PROCEDURE — 160035 HCHG PACU - 1ST 60 MINS PHASE I: Performed by: OBSTETRICS & GYNECOLOGY

## 2021-03-07 PROCEDURE — 88305 TISSUE EXAM BY PATHOLOGIST: CPT

## 2021-03-07 PROCEDURE — 86850 RBC ANTIBODY SCREEN: CPT

## 2021-03-07 PROCEDURE — 88307 TISSUE EXAM BY PATHOLOGIST: CPT

## 2021-03-07 PROCEDURE — A9270 NON-COVERED ITEM OR SERVICE: HCPCS | Performed by: STUDENT IN AN ORGANIZED HEALTH CARE EDUCATION/TRAINING PROGRAM

## 2021-03-07 PROCEDURE — 160036 HCHG PACU - EA ADDL 30 MINS PHASE I: Performed by: OBSTETRICS & GYNECOLOGY

## 2021-03-07 PROCEDURE — 160029 HCHG SURGERY MINUTES - 1ST 30 MINS LEVEL 4: Performed by: OBSTETRICS & GYNECOLOGY

## 2021-03-07 PROCEDURE — 86900 BLOOD TYPING SEROLOGIC ABO: CPT

## 2021-03-07 PROCEDURE — 36415 COLL VENOUS BLD VENIPUNCTURE: CPT

## 2021-03-07 PROCEDURE — 85027 COMPLETE CBC AUTOMATED: CPT

## 2021-03-07 PROCEDURE — 59510 CESAREAN DELIVERY: CPT | Performed by: OBSTETRICS & GYNECOLOGY

## 2021-03-07 PROCEDURE — 160041 HCHG SURGERY MINUTES - EA ADDL 1 MIN LEVEL 4: Performed by: OBSTETRICS & GYNECOLOGY

## 2021-03-07 PROCEDURE — 700111 HCHG RX REV CODE 636 W/ 250 OVERRIDE (IP): Performed by: STUDENT IN AN ORGANIZED HEALTH CARE EDUCATION/TRAINING PROGRAM

## 2021-03-07 PROCEDURE — 85025 COMPLETE CBC W/AUTO DIFF WBC: CPT

## 2021-03-07 PROCEDURE — 88302 TISSUE EXAM BY PATHOLOGIST: CPT

## 2021-03-07 PROCEDURE — 87426 SARSCOV CORONAVIRUS AG IA: CPT

## 2021-03-07 PROCEDURE — 0UB70ZZ EXCISION OF BILATERAL FALLOPIAN TUBES, OPEN APPROACH: ICD-10-PCS | Performed by: OBSTETRICS & GYNECOLOGY

## 2021-03-07 PROCEDURE — 160002 HCHG RECOVERY MINUTES (STAT): Performed by: OBSTETRICS & GYNECOLOGY

## 2021-03-07 PROCEDURE — 86901 BLOOD TYPING SEROLOGIC RH(D): CPT

## 2021-03-07 PROCEDURE — 59514 CESAREAN DELIVERY ONLY: CPT | Mod: 80

## 2021-03-07 PROCEDURE — 700101 HCHG RX REV CODE 250: Performed by: STUDENT IN AN ORGANIZED HEALTH CARE EDUCATION/TRAINING PROGRAM

## 2021-03-07 PROCEDURE — 700105 HCHG RX REV CODE 258: Performed by: STUDENT IN AN ORGANIZED HEALTH CARE EDUCATION/TRAINING PROGRAM

## 2021-03-07 PROCEDURE — 160048 HCHG OR STATISTICAL LEVEL 1-5: Performed by: OBSTETRICS & GYNECOLOGY

## 2021-03-07 PROCEDURE — 770002 HCHG ROOM/CARE - OB PRIVATE (112)

## 2021-03-07 PROCEDURE — 0HB7XZZ EXCISION OF ABDOMEN SKIN, EXTERNAL APPROACH: ICD-10-PCS | Performed by: OBSTETRICS & GYNECOLOGY

## 2021-03-07 PROCEDURE — 700102 HCHG RX REV CODE 250 W/ 637 OVERRIDE(OP): Performed by: STUDENT IN AN ORGANIZED HEALTH CARE EDUCATION/TRAINING PROGRAM

## 2021-03-07 RX ORDER — SODIUM CHLORIDE, SODIUM LACTATE, POTASSIUM CHLORIDE, CALCIUM CHLORIDE 600; 310; 30; 20 MG/100ML; MG/100ML; MG/100ML; MG/100ML
INJECTION, SOLUTION INTRAVENOUS CONTINUOUS
Status: DISCONTINUED | OUTPATIENT
Start: 2021-03-07 | End: 2021-03-09 | Stop reason: HOSPADM

## 2021-03-07 RX ORDER — OXYCODONE HYDROCHLORIDE 5 MG/1
5 TABLET ORAL EVERY 4 HOURS PRN
Status: DISCONTINUED | OUTPATIENT
Start: 2021-03-07 | End: 2021-03-08 | Stop reason: HOSPADM

## 2021-03-07 RX ORDER — BUPIVACAINE HYDROCHLORIDE 7.5 MG/ML
INJECTION, SOLUTION INTRASPINAL
Status: COMPLETED | OUTPATIENT
Start: 2021-03-07 | End: 2021-03-07

## 2021-03-07 RX ORDER — SODIUM CHLORIDE, SODIUM LACTATE, POTASSIUM CHLORIDE, CALCIUM CHLORIDE 600; 310; 30; 20 MG/100ML; MG/100ML; MG/100ML; MG/100ML
INJECTION, SOLUTION INTRAVENOUS PRN
Status: DISCONTINUED | OUTPATIENT
Start: 2021-03-07 | End: 2021-03-10 | Stop reason: HOSPADM

## 2021-03-07 RX ORDER — GLYCOPYRROLATE 0.2 MG/ML
INJECTION INTRAMUSCULAR; INTRAVENOUS PRN
Status: DISCONTINUED | OUTPATIENT
Start: 2021-03-07 | End: 2021-03-07 | Stop reason: SURG

## 2021-03-07 RX ORDER — HYDROMORPHONE HYDROCHLORIDE 1 MG/ML
0.4 INJECTION, SOLUTION INTRAMUSCULAR; INTRAVENOUS; SUBCUTANEOUS
Status: DISCONTINUED | OUTPATIENT
Start: 2021-03-07 | End: 2021-03-07 | Stop reason: HOSPADM

## 2021-03-07 RX ORDER — MORPHINE SULFATE 0.5 MG/ML
INJECTION, SOLUTION EPIDURAL; INTRATHECAL; INTRAVENOUS
Status: COMPLETED | OUTPATIENT
Start: 2021-03-07 | End: 2021-03-07

## 2021-03-07 RX ORDER — METOCLOPRAMIDE HYDROCHLORIDE 5 MG/ML
INJECTION INTRAMUSCULAR; INTRAVENOUS PRN
Status: DISCONTINUED | OUTPATIENT
Start: 2021-03-07 | End: 2021-03-07 | Stop reason: SURG

## 2021-03-07 RX ORDER — HYDROMORPHONE HYDROCHLORIDE 1 MG/ML
0.2 INJECTION, SOLUTION INTRAMUSCULAR; INTRAVENOUS; SUBCUTANEOUS
Status: DISCONTINUED | OUTPATIENT
Start: 2021-03-07 | End: 2021-03-08 | Stop reason: HOSPADM

## 2021-03-07 RX ORDER — OXYCODONE HCL 5 MG/5 ML
10 SOLUTION, ORAL ORAL
Status: COMPLETED | OUTPATIENT
Start: 2021-03-07 | End: 2021-03-07

## 2021-03-07 RX ORDER — KETOROLAC TROMETHAMINE 30 MG/ML
30 INJECTION, SOLUTION INTRAMUSCULAR; INTRAVENOUS EVERY 6 HOURS
Status: DISCONTINUED | OUTPATIENT
Start: 2021-03-07 | End: 2021-03-07

## 2021-03-07 RX ORDER — DIPHENHYDRAMINE HYDROCHLORIDE 50 MG/ML
12.5 INJECTION INTRAMUSCULAR; INTRAVENOUS EVERY 6 HOURS PRN
Status: DISCONTINUED | OUTPATIENT
Start: 2021-03-07 | End: 2021-03-08 | Stop reason: HOSPADM

## 2021-03-07 RX ORDER — SODIUM CHLORIDE, SODIUM GLUCONATE, SODIUM ACETATE, POTASSIUM CHLORIDE AND MAGNESIUM CHLORIDE 526; 502; 368; 37; 30 MG/100ML; MG/100ML; MG/100ML; MG/100ML; MG/100ML
INJECTION, SOLUTION INTRAVENOUS
Status: DISCONTINUED | OUTPATIENT
Start: 2021-03-07 | End: 2021-03-07 | Stop reason: SURG

## 2021-03-07 RX ORDER — HYDROMORPHONE HYDROCHLORIDE 1 MG/ML
0.2 INJECTION, SOLUTION INTRAMUSCULAR; INTRAVENOUS; SUBCUTANEOUS
Status: DISCONTINUED | OUTPATIENT
Start: 2021-03-07 | End: 2021-03-07 | Stop reason: HOSPADM

## 2021-03-07 RX ORDER — ONDANSETRON 2 MG/ML
4 INJECTION INTRAMUSCULAR; INTRAVENOUS EVERY 6 HOURS PRN
Status: DISCONTINUED | OUTPATIENT
Start: 2021-03-07 | End: 2021-03-08 | Stop reason: HOSPADM

## 2021-03-07 RX ORDER — OXYCODONE HCL 5 MG/5 ML
5 SOLUTION, ORAL ORAL
Status: COMPLETED | OUTPATIENT
Start: 2021-03-07 | End: 2021-03-07

## 2021-03-07 RX ORDER — DEXAMETHASONE SODIUM PHOSPHATE 4 MG/ML
INJECTION, SOLUTION INTRA-ARTICULAR; INTRALESIONAL; INTRAMUSCULAR; INTRAVENOUS; SOFT TISSUE PRN
Status: DISCONTINUED | OUTPATIENT
Start: 2021-03-07 | End: 2021-03-07 | Stop reason: SURG

## 2021-03-07 RX ORDER — ACETAMINOPHEN 500 MG
1000 TABLET ORAL EVERY 6 HOURS
Status: COMPLETED | OUTPATIENT
Start: 2021-03-07 | End: 2021-03-08

## 2021-03-07 RX ORDER — KETOROLAC TROMETHAMINE 30 MG/ML
30 INJECTION, SOLUTION INTRAMUSCULAR; INTRAVENOUS EVERY 6 HOURS
Status: COMPLETED | OUTPATIENT
Start: 2021-03-07 | End: 2021-03-08

## 2021-03-07 RX ORDER — MISOPROSTOL 200 UG/1
800 TABLET ORAL
Status: DISCONTINUED | OUTPATIENT
Start: 2021-03-07 | End: 2021-03-10 | Stop reason: HOSPADM

## 2021-03-07 RX ORDER — ONDANSETRON 2 MG/ML
4 INJECTION INTRAMUSCULAR; INTRAVENOUS
Status: DISCONTINUED | OUTPATIENT
Start: 2021-03-07 | End: 2021-03-07 | Stop reason: HOSPADM

## 2021-03-07 RX ORDER — ACETAMINOPHEN 325 MG/1
650 TABLET ORAL EVERY 4 HOURS PRN
Status: DISCONTINUED | OUTPATIENT
Start: 2021-03-08 | End: 2021-03-10 | Stop reason: HOSPADM

## 2021-03-07 RX ORDER — IBUPROFEN 800 MG/1
800 TABLET ORAL EVERY 8 HOURS
Status: DISCONTINUED | OUTPATIENT
Start: 2021-03-08 | End: 2021-03-10 | Stop reason: HOSPADM

## 2021-03-07 RX ORDER — SODIUM CHLORIDE, SODIUM GLUCONATE, SODIUM ACETATE, POTASSIUM CHLORIDE AND MAGNESIUM CHLORIDE 526; 502; 368; 37; 30 MG/100ML; MG/100ML; MG/100ML; MG/100ML; MG/100ML
1500 INJECTION, SOLUTION INTRAVENOUS ONCE
Status: COMPLETED | OUTPATIENT
Start: 2021-03-07 | End: 2021-03-07

## 2021-03-07 RX ORDER — MEPERIDINE HYDROCHLORIDE 25 MG/ML
12.5 INJECTION INTRAMUSCULAR; INTRAVENOUS; SUBCUTANEOUS
Status: DISCONTINUED | OUTPATIENT
Start: 2021-03-07 | End: 2021-03-07 | Stop reason: HOSPADM

## 2021-03-07 RX ORDER — PHENYLEPHRINE HYDROCHLORIDE 10 MG/ML
INJECTION, SOLUTION INTRAMUSCULAR; INTRAVENOUS; SUBCUTANEOUS PRN
Status: DISCONTINUED | OUTPATIENT
Start: 2021-03-07 | End: 2021-03-07 | Stop reason: SURG

## 2021-03-07 RX ORDER — IBUPROFEN 800 MG/1
800 TABLET ORAL EVERY 8 HOURS
Status: DISCONTINUED | OUTPATIENT
Start: 2021-03-07 | End: 2021-03-07

## 2021-03-07 RX ORDER — METOCLOPRAMIDE HYDROCHLORIDE 5 MG/ML
10 INJECTION INTRAMUSCULAR; INTRAVENOUS ONCE
Status: COMPLETED | OUTPATIENT
Start: 2021-03-07 | End: 2021-03-07

## 2021-03-07 RX ORDER — CITRIC ACID/SODIUM CITRATE 334-500MG
30 SOLUTION, ORAL ORAL ONCE
Status: COMPLETED | OUTPATIENT
Start: 2021-03-07 | End: 2021-03-07

## 2021-03-07 RX ORDER — CEFAZOLIN SODIUM 1 G/3ML
INJECTION, POWDER, FOR SOLUTION INTRAMUSCULAR; INTRAVENOUS PRN
Status: DISCONTINUED | OUTPATIENT
Start: 2021-03-07 | End: 2021-03-07 | Stop reason: SURG

## 2021-03-07 RX ORDER — ONDANSETRON 2 MG/ML
INJECTION INTRAMUSCULAR; INTRAVENOUS PRN
Status: DISCONTINUED | OUTPATIENT
Start: 2021-03-07 | End: 2021-03-07 | Stop reason: SURG

## 2021-03-07 RX ORDER — HALOPERIDOL 5 MG/ML
1 INJECTION INTRAMUSCULAR
Status: DISCONTINUED | OUTPATIENT
Start: 2021-03-07 | End: 2021-03-07 | Stop reason: HOSPADM

## 2021-03-07 RX ORDER — OXYCODONE HYDROCHLORIDE 5 MG/1
10 TABLET ORAL EVERY 4 HOURS PRN
Status: DISCONTINUED | OUTPATIENT
Start: 2021-03-07 | End: 2021-03-08 | Stop reason: HOSPADM

## 2021-03-07 RX ORDER — ACETAMINOPHEN 500 MG
1000 TABLET ORAL EVERY 6 HOURS
Status: DISCONTINUED | OUTPATIENT
Start: 2021-03-07 | End: 2021-03-07

## 2021-03-07 RX ORDER — DIPHENHYDRAMINE HYDROCHLORIDE 50 MG/ML
12.5 INJECTION INTRAMUSCULAR; INTRAVENOUS
Status: DISCONTINUED | OUTPATIENT
Start: 2021-03-07 | End: 2021-03-07 | Stop reason: HOSPADM

## 2021-03-07 RX ORDER — HYDROMORPHONE HYDROCHLORIDE 1 MG/ML
0.1 INJECTION, SOLUTION INTRAMUSCULAR; INTRAVENOUS; SUBCUTANEOUS
Status: DISCONTINUED | OUTPATIENT
Start: 2021-03-07 | End: 2021-03-07 | Stop reason: HOSPADM

## 2021-03-07 RX ORDER — HYDROMORPHONE HYDROCHLORIDE 1 MG/ML
0.4 INJECTION, SOLUTION INTRAMUSCULAR; INTRAVENOUS; SUBCUTANEOUS
Status: DISCONTINUED | OUTPATIENT
Start: 2021-03-07 | End: 2021-03-08 | Stop reason: HOSPADM

## 2021-03-07 RX ADMIN — OXYTOCIN 125 ML/HR: 10 INJECTION, SOLUTION INTRAMUSCULAR; INTRAVENOUS at 14:47

## 2021-03-07 RX ADMIN — OXYCODONE HYDROCHLORIDE 10 MG: 5 SOLUTION ORAL at 13:38

## 2021-03-07 RX ADMIN — ACETAMINOPHEN 1000 MG: 500 TABLET, FILM COATED ORAL at 20:24

## 2021-03-07 RX ADMIN — ONDANSETRON 4 MG: 2 INJECTION INTRAMUSCULAR; INTRAVENOUS at 11:07

## 2021-03-07 RX ADMIN — GLYCOPYRROLATE 0.2 MG: 0.2 INJECTION INTRAMUSCULAR; INTRAVENOUS at 11:11

## 2021-03-07 RX ADMIN — PHENYLEPHRINE HYDROCHLORIDE 200 MCG: 10 INJECTION INTRAVENOUS at 11:57

## 2021-03-07 RX ADMIN — SODIUM CHLORIDE, SODIUM GLUCONATE, SODIUM ACETATE, POTASSIUM CHLORIDE AND MAGNESIUM CHLORIDE: 526; 502; 368; 37; 30 INJECTION, SOLUTION INTRAVENOUS at 10:53

## 2021-03-07 RX ADMIN — BUPIVACAINE HYDROCHLORIDE IN DEXTROSE 0.5 ML: 7.5 INJECTION, SOLUTION SUBARACHNOID at 11:06

## 2021-03-07 RX ADMIN — FENTANYL CITRATE 15 MCG: 50 INJECTION, SOLUTION INTRAMUSCULAR; INTRAVENOUS at 11:06

## 2021-03-07 RX ADMIN — ACETAMINOPHEN 1000 MG: 500 TABLET, FILM COATED ORAL at 14:45

## 2021-03-07 RX ADMIN — DEXAMETHASONE SODIUM PHOSPHATE 4 MG: 4 INJECTION, SOLUTION INTRA-ARTICULAR; INTRALESIONAL; INTRAMUSCULAR; INTRAVENOUS; SOFT TISSUE at 11:07

## 2021-03-07 RX ADMIN — FAMOTIDINE 20 MG: 10 INJECTION INTRAVENOUS at 10:06

## 2021-03-07 RX ADMIN — PHENYLEPHRINE HYDROCHLORIDE 100 MCG: 10 INJECTION INTRAVENOUS at 11:07

## 2021-03-07 RX ADMIN — METOCLOPRAMIDE 10 MG: 5 INJECTION, SOLUTION INTRAMUSCULAR; INTRAVENOUS at 10:05

## 2021-03-07 RX ADMIN — METOCLOPRAMIDE 10 MG: 5 INJECTION, SOLUTION INTRAMUSCULAR; INTRAVENOUS at 11:58

## 2021-03-07 RX ADMIN — PHENYLEPHRINE HYDROCHLORIDE 200 MCG: 10 INJECTION INTRAVENOUS at 11:51

## 2021-03-07 RX ADMIN — CEFAZOLIN 2 G: 330 INJECTION, POWDER, FOR SOLUTION INTRAMUSCULAR; INTRAVENOUS at 11:07

## 2021-03-07 RX ADMIN — MORPHINE SULFATE 150 MCG: 0.5 INJECTION, SOLUTION EPIDURAL; INTRATHECAL; INTRAVENOUS at 11:06

## 2021-03-07 RX ADMIN — KETOROLAC TROMETHAMINE 30 MG: 30 INJECTION, SOLUTION INTRAMUSCULAR; INTRAVENOUS at 20:24

## 2021-03-07 RX ADMIN — SODIUM CHLORIDE, SODIUM GLUCONATE, SODIUM ACETATE, POTASSIUM CHLORIDE AND MAGNESIUM CHLORIDE 1500 ML: 526; 502; 368; 37; 30 INJECTION, SOLUTION INTRAVENOUS at 10:05

## 2021-03-07 RX ADMIN — PHENYLEPHRINE HYDROCHLORIDE 200 MCG: 10 INJECTION INTRAVENOUS at 11:42

## 2021-03-07 RX ADMIN — PHENYLEPHRINE HYDROCHLORIDE 200 MCG: 10 INJECTION INTRAVENOUS at 12:26

## 2021-03-07 RX ADMIN — PHENYLEPHRINE HYDROCHLORIDE 200 MCG: 10 INJECTION INTRAVENOUS at 12:05

## 2021-03-07 RX ADMIN — PHENYLEPHRINE HYDROCHLORIDE 200 MCG: 10 INJECTION INTRAVENOUS at 12:04

## 2021-03-07 RX ADMIN — SODIUM CITRATE AND CITRIC ACID MONOHYDRATE 30 ML: 500; 334 SOLUTION ORAL at 10:06

## 2021-03-07 RX ADMIN — KETOROLAC TROMETHAMINE 30 MG: 30 INJECTION, SOLUTION INTRAMUSCULAR; INTRAVENOUS at 14:46

## 2021-03-07 RX ADMIN — GLYCOPYRROLATE 0.2 MG: 0.2 INJECTION INTRAMUSCULAR; INTRAVENOUS at 11:51

## 2021-03-07 RX ADMIN — OXYTOCIN 1000 ML: 10 INJECTION, SOLUTION INTRAMUSCULAR; INTRAVENOUS at 11:38

## 2021-03-07 ASSESSMENT — LIFESTYLE VARIABLES
TOTAL SCORE: 0
AVERAGE NUMBER OF DAYS PER WEEK YOU HAVE A DRINK CONTAINING ALCOHOL: 0
HAVE PEOPLE ANNOYED YOU BY CRITICIZING YOUR DRINKING: NO
CONSUMPTION TOTAL: NEGATIVE
EVER HAD A DRINK FIRST THING IN THE MORNING TO STEADY YOUR NERVES TO GET RID OF A HANGOVER: NO
TOTAL SCORE: 0
HAVE YOU EVER FELT YOU SHOULD CUT DOWN ON YOUR DRINKING: NO
ALCOHOL_USE: NO
EVER FELT BAD OR GUILTY ABOUT YOUR DRINKING: NO
EVER_SMOKED: YES
ON A TYPICAL DAY WHEN YOU DRINK ALCOHOL HOW MANY DRINKS DO YOU HAVE: 0
HOW MANY TIMES IN THE PAST YEAR HAVE YOU HAD 5 OR MORE DRINKS IN A DAY: 0
TOTAL SCORE: 0

## 2021-03-07 ASSESSMENT — PATIENT HEALTH QUESTIONNAIRE - PHQ9
1. LITTLE INTEREST OR PLEASURE IN DOING THINGS: NOT AT ALL
SUM OF ALL RESPONSES TO PHQ9 QUESTIONS 1 AND 2: 0
2. FEELING DOWN, DEPRESSED, IRRITABLE, OR HOPELESS: NOT AT ALL

## 2021-03-07 ASSESSMENT — PAIN DESCRIPTION - PAIN TYPE
TYPE: SURGICAL PAIN
TYPE: SURGICAL PAIN

## 2021-03-07 ASSESSMENT — FIBROSIS 4 INDEX: FIB4 SCORE: .5970149253731343284

## 2021-03-07 NOTE — PROGRESS NOTES
0730- pt arrived to unit for scheduled c/s. Pt of [facility], , EDC 3/12, GA 39.2. SO Austin at bedside.   1050- pt ambulated to OR 1 in stable condition.   1052- In OR 1  1136- delivery of male infant, APGARs 8/9.   1241- Pt transferred from OR to PACU 3 in stable condition, report received from Dr. Longoria. VSS.   1400- report to Tangela DAVIS.

## 2021-03-07 NOTE — ANESTHESIA PREPROCEDURE EVALUATION
"Anes H&P:  PAST MEDICAL HISTORY:   30 y.o. female who presents for Procedure(s) (LRB):   SECTION, REPEAT, WITH SALPINGECTOMY (Bilateral).  She has current and past medical problems significant for:    Past Medical History:   Diagnosis Date   • Allergy     seasonal allergys.    • Anemia    • Asthma for \"all her life\", stable on advair and uses rescue albuterol inhaler PRN 10/4/2011    Last Attack, 12yrs old   • Eczema     dx as a child.    • Urinary tract infection, site not specified            SMOKING/ALCOHOL/RECREATIONAL DRUG USE:  Social History     Tobacco Use   • Smoking status: Former Smoker     Years: 1.00     Types: Cigarettes     Quit date: 2011     Years since quittin.8   • Smokeless tobacco: Never Used   • Tobacco comment: quit, used to smoke about 3 cigs a day.    Substance Use Topics   • Alcohol use: Yes     Comment: on occations partys/ weekends.   • Drug use: No     Social History     Substance and Sexual Activity   Drug Use No       PAST SURGICAL HISTORY:  Past Surgical History:   Procedure Laterality Date   • OTHER ABDOMINAL SURGERY  2012    lap choly   • HENNY BY LAPAROSCOPY  2012    Performed by ERINN JANG at SURGERY [facility]S   • PRIMARY C SECTION  2012    Performed by THALIA JOY at LABOR AND DELIVERY   • EYE SURGERY      7 yrs old, Ear surgery as well at years old       ALLERGIES:   Allergies   Allergen Reactions   • Penicillins Hives and Rash   • Food      \"Peanuts and tree nuts\"       MEDICATIONS:  No current facility-administered medications on file prior to encounter.     Current Outpatient Medications on File Prior to Encounter   Medication Sig Dispense Refill   • diphenhydramine (SOMINEX) 25 MG tablet Take 25 mg by mouth as needed.     • albuterol (PROVENTIL) 2.5mg/3ml Nebu Soln solution for nebulization USE 3 ML VIA NEBULIZER EVERY 6 TO 8 HOURS AS NEEDED     • albuterol 108 (90 Base) MCG/ACT Aero Soln inhalation aerosol Inhale 2 Puffs every " "6 hours as needed for Shortness of Breath.     • Prenatal Vit-Fe Fumarate-FA (PRENATAL 1+1 PO) Take  by mouth.         LABS:  Lab Results   Component Value Date/Time    HEMOGLOBIN 11.7 (L) 2021 0931    HEMATOCRIT 36.1 (L) 2021 0931    WBC 10.1 2021 0931     Lab Results   Component Value Date/Time    SODIUM 138 2013 2226    POTASSIUM 3.9 2013 2226    CHLORIDE 106 2013 2226    CO2 25 2013 2226    GLUCOSE 102 (H) 2018 1230    BUN 7 (L) 2013 2226    CALCIUM 9.5 2013 2226       SARS-CoV2 result: Negative      PREVIOUS ANESTHETICS: See EMR  __________________________________________      Relevant Problems   PULMONARY   (+) Asthma for \"all her life\", stable on advair and uses rescue albuterol inhaler PRN      OB   (+) Hx of  section       Physical Exam    Airway   Mallampati: II  TM distance: >3 FB  Neck ROM: full       Cardiovascular - normal exam  Rhythm: regular  Rate: normal  (-) murmur     Dental - normal exam           Pulmonary - normal exam  Breath sounds clear to auscultation     Abdominal     Comments: gravid   Neurological - normal exam                 Anesthesia Plan    ASA 2       Plan - spinal   Neuraxial block will be primary anesthetic            Induction: intravenous    Postoperative Plan: Postoperative administration of opioids is intended.    Pertinent diagnostic labs and testing reviewed    Informed Consent:    Anesthetic plan and risks discussed with patient.    Use of blood products discussed with: patient whom consented to blood products.         "

## 2021-03-07 NOTE — ANESTHESIA TIME REPORT
Anesthesia Start and Stop Event Times     Date Time Event    3/7/2021 0747 Ready for Procedure     1053 Anesthesia Start     1317 Anesthesia Stop        Responsible Staff  21    Name Role Begin End    Goyo Longoria M.D. Anesth 1053 1317        Preop Diagnosis (Free Text):  Pre-op Diagnosis     PREVIOUS  SECTION, PERMANENT STERILZATION   39+2        Preop Diagnosis (Codes):  Diagnosis Information     Diagnosis Code(s):  delivery delivered [O82]        Post op Diagnosis  Labor and delivery, indication for care      Premium Reason  E. Weekend    Comments:

## 2021-03-07 NOTE — ANESTHESIA PROCEDURE NOTES
Spinal Block    Date/Time: 3/7/2021 11:06 AM  Performed by: Goyo Longoria M.D.  Authorized by: Goyo Longoria M.D.     Start Time:  3/7/2021 11:06 AM  Reason for Block: primary anesthetic    patient identified, IV checked, site marked, risks and benefits discussed, surgical consent, monitors and equipment checked, pre-op evaluation and timeout performed    Patient Position:  Sitting  Prep: ChloraPrep, patient draped and sterile technique    Monitoring:  Blood pressure, continuous pulse oximetry and heart rate  Approach:  Midline  Location:  L3-4  Injection Technique:  Single-shot  Skin infiltration:  Lidocaine  Strength:  1%  Dose:  3ml  Needle Type:  Pencan  Needle Gauge:  25 G  CSF flowing pre/post injection:  Yes  Sensory Level:  T4

## 2021-03-07 NOTE — H&P
"History and Physical    Shakila Salgado is a 30 y.o. female  at 39w2d who presents for scheduled c/s.  She also wants a tubal at the same time with a scar revision of her umbilicus. Pregnancy was complicated by IUGR early in pregnancy which has since resolved.      Subjective:   CTXs: negative   Pain: negative  LOF: negative  Vaginal bleeding: negative   Fetal movement: positive    ROS: Pertinent positives documented in HPI and all other systems reviewed & are negative    OB History    Para Term  AB Living   2 1 1 0 0 1   SAB TAB Ectopic Molar Multiple Live Births   0 0 0 0 0 1      # Outcome Date GA Lbr Fredrick/2nd Weight Sex Delivery Anes PTL Lv   2 Current            1 Term 12   3.402 kg (7 lb 8 oz) F CS-LTranv Spinal N GARY      Complications: Heart disease, Breech presentation       PGYNHx: nothing significant     Past Medical History:   Diagnosis Date   • Allergy     seasonal allergys.    • Anemia    • Asthma for \"all her life\", stable on advair and uses rescue albuterol inhaler PRN 10/4/2011    Last Attack, 12yrs old   • Eczema     dx as a child.    • Urinary tract infection, site not specified            Past Surgical History:   Procedure Laterality Date   • OTHER ABDOMINAL SURGERY  2012    lap choly   • HENNY BY LAPAROSCOPY  2012    Performed by ERINN JANG at SURGERY [facility]   • PRIMARY C SECTION  2012    Performed by GOYO JOY at LABOR AND DELIVERY   • EYE SURGERY      7 yrs old, Ear surgery as well at years old         Current Facility-Administered Medications:   •  electrolyte-A (PLASMALYTE-A) infusion 1,500 mL, 1,500 mL, Intravenous, Once, Goyo Longoria M.D.  •  Na citrate-citric acid (BICITRA) 500-334 MG/5ML solution 30 mL, 30 mL, Oral, Once, Goyo Longoria M.D.  •  famotidine (PEPCID) injection 20 mg, 20 mg, Intravenous, Once, Goyo Longoria M.D.  •  metoclopramide (REGLAN) injection 10 mg, 10 mg, Intravenous, Once, Goyo Longoria, " M.D.    Allergies: Penicillins and Food    Social History     Socioeconomic History   • Marital status:      Spouse name: Not on file   • Number of children: Not on file   • Years of education: Not on file   • Highest education level: Not on file   Occupational History   • Not on file   Tobacco Use   • Smoking status: Former Smoker     Years: 1.00     Types: Cigarettes     Quit date: 2011     Years since quittin.8   • Smokeless tobacco: Never Used   • Tobacco comment: quit, used to smoke about 3 cigs a day.    Substance and Sexual Activity   • Alcohol use: Yes     Comment: on occations partys/ weekends.   • Drug use: No   • Sexual activity: Yes     Partners: Male     Birth control/protection: Injection   Other Topics Concern   • Not on file   Social History Narrative   • Not on file     Social Determinants of Health     Financial Resource Strain:    • Difficulty of Paying Living Expenses:    Food Insecurity:    • Worried About Running Out of Food in the Last Year:    • Ran Out of Food in the Last Year:    Transportation Needs:    • Lack of Transportation (Medical):    • Lack of Transportation (Non-Medical):    Physical Activity:    • Days of Exercise per Week:    • Minutes of Exercise per Session:    Stress:    • Feeling of Stress :    Social Connections:    • Frequency of Communication with Friends and Family:    • Frequency of Social Gatherings with Friends and Family:    • Attends Bahai Services:    • Active Member of Clubs or Organizations:    • Attends Club or Organization Meetings:    • Marital Status:    Intimate Partner Violence:    • Fear of Current or Ex-Partner:    • Emotionally Abused:    • Physically Abused:    • Sexually Abused:          FamHx:   denies    Prenatal care with [facility] with following problems:  Patient Active Problem List    Diagnosis Date Noted   • Poor fetal growth affecting management of mother in third trimester 2021   • Hx of  section  "2021   • Breech presentation,  section on 2012   • Pericardial effusion- fetal 2012   • Asthma for \"all her life\", stable on advair and uses rescue albuterol inhaler PRN 10/04/2011         Objective:      Ht 1.651 m (5' 5\")   Wt (!) 202 kg (445 lb 5.3 oz)     General:   no acute distress, alert, cooperative   Skin:   normal   HEENT:  EOMI   Lungs:   CTA bilateral   Heart:   chest is clear without rales or wheezing, no pedal edema, S1, S2 normal, no murmur, regular rate and rhythm   Abdomen:   soft, gravid, NT   EFW:  AGA   Pelvis:  adequate with gynecoid pelvis   FHT:  120 BPM  Accels +  Decels -  Variability moderate  Category 1   Uterine Size: S=D   Presentations: Cephalic     Lab Review  Lab:   Reviewed [facility] and wnl    Recent Results (from the past 5880 hour(s))   Chlamydia/GC PCR Urine Or Swab    Collection Time: 20  4:21 PM    Specimen: Urine   Result Value Ref Range    C. trachomatis by PCR Negative Negative    N. gonorrhoeae by PCR Negative Negative    Source Genital    CBC WITHOUT DIFFERENTIAL    Collection Time: 21  3:19 PM   Result Value Ref Range    WBC 10.9 (H) 4.8 - 10.8 K/uL    RBC 3.44 (L) 4.20 - 5.40 M/uL    Hemoglobin 10.2 (L) 12.0 - 16.0 g/dL    Hematocrit 32.2 (L) 37.0 - 47.0 %    MCV 93.6 81.4 - 97.8 fL    MCH 29.7 27.0 - 33.0 pg    MCHC 31.7 (L) 33.6 - 35.0 g/dL    RDW 42.5 35.9 - 50.0 fL    Platelet Count 310 164 - 446 K/uL    MPV 10.9 9.0 - 12.9 fL   T.PALLIDUM AB EIA    Collection Time: 21  3:19 PM   Result Value Ref Range    Syphilis, Treponemal Qual Non-Reactive Non-Reactive   GLUCOSE 1HR GESTATIONAL    Collection Time: 21  3:20 PM   Result Value Ref Range    Glucose, Post Dose 78 70 - 139 mg/dL   POCT Fetal Nonstress Test    Collection Time: 21  1:11 PM   Result Value Ref Range    NST Indications      NST Baseline      NST Uterine Activity      NST Acoustic " Stimulation      NST Assessment      NST Action Necessary      NST Other Data      NST Return      NST Read By     GRP B STREP, BY PCR (RUBIO BROTH)    Collection Time: 02/16/21  3:23 PM    Specimen: Genital   Result Value Ref Range    Strep Gp B DNA PCR Negative Negative   PROTEIN/CREAT RATIO URINE    Collection Time: 03/04/21  9:31 AM   Result Value Ref Range    Total Protein, Urine 49.0 (H) 0.0 - 15.0 mg/dL    Creatinine, Random Urine 311.99 mg/dL    Protein Creatinine Ratio 157 (H) 10 - 107 mg/g   CREATININE    Collection Time: 03/04/21  9:31 AM   Result Value Ref Range    Creatinine 0.61 0.50 - 1.40 mg/dL   CBC WITHOUT DIFFERENTIAL    Collection Time: 03/04/21  9:31 AM   Result Value Ref Range    WBC 10.1 4.8 - 10.8 K/uL    RBC 3.88 (L) 4.20 - 5.40 M/uL    Hemoglobin 11.7 (L) 12.0 - 16.0 g/dL    Hematocrit 36.1 (L) 37.0 - 47.0 %    MCV 93.0 81.4 - 97.8 fL    MCH 30.2 27.0 - 33.0 pg    MCHC 32.4 (L) 33.6 - 35.0 g/dL    RDW 49.1 35.9 - 50.0 fL    Platelet Count 268 164 - 446 K/uL    MPV 10.5 9.0 - 12.9 fL   ASPARTATE AMINO-BULL    Collection Time: 03/04/21  9:31 AM   Result Value Ref Range    AST(SGOT) 16 12 - 45 U/L   ALANINE AMINO-TRANS    Collection Time: 03/04/21  9:31 AM   Result Value Ref Range    ALT(SGPT) 9 2 - 50 U/L   ESTIMATED GFR    Collection Time: 03/04/21  9:31 AM   Result Value Ref Range    GFR If African American >60 >60 mL/min/1.73 m 2    GFR If Non African American >60 >60 mL/min/1.73 m 2   SARS-COV Antigen KELLY: Collect dry nasal swab AND NP swab in VTM    Collection Time: 03/07/21  7:49 AM   Result Value Ref Range    SARS-CoV-2 Source Nasal Swab    SARS-CoV-2 PCR (24 hour In-House): Collect NP swab in VTM    Collection Time: 03/07/21  7:56 AM    Specimen: Nasopharyngeal; Respirate   Result Value Ref Range    SARS-CoV-2 Source NP Swab         Assessment:   Shakila Salgado at 39w2d  Labor status: Not in labor.  Scheduled repeat c/s with tubal and scar revision     Obstetrical history  "significant for   Patient Active Problem List    Diagnosis Date Noted   • Poor fetal growth affecting management of mother in third trimester 2021   • Hx of  section 2021   • Breech presentation,  section on 2012   • Pericardial effusion- fetal 2012   • Asthma for \"all her life\", stable on advair and uses rescue albuterol inhaler PRN 10/04/2011   .      Plan:     Admit to L&D  GBS negative  Fetal monitoring/toco as per protocol    I discussed w/ pt risks of surgery including pain, bleeding, infection, risk of damage to intraabdominal structures including bowel/bladder/ureters.  Pt confirms she is accepting of blood transfusion in case of emergency.  All questions answered.      Counseled patient on the risks, benefits, and alternatives to tubal ligation surgery. Pt aware of risk for pain, infection, bleeding, transfusion, injury to adjacent organs, anesthesia complications and death with surgery in general.     Specifically,  I discussed that tubal ligation is considered a permanent procedure and the patient will not be able to get pregnant after the tubal ligation is performed. I  also discussed the risk of tubal failure, which is one in 200 surgeries. The patient is informed that if tubal ligation fails, she has a risk of intrauterine pregnancy or increased risk of ectopic pregnancy due to tubal ligation. Alternative methods for birth control were discussed including abstinence, barrier methods condoms and diaphragm, and hormonal contraception including birth control pills and Depo-Provera, intrauterine device and male sterilization. Also discussed the risk of tubal regret syndrome which is more common in those of young age and low parity. Patient voiced understanding of all these risks.  We also discussed the potential benefits of removing both tubes with possible reduction in future risk of ovarian cancer, but it may increase the complexity of the " procedure and the length of the procedure.     The risks of the surgical procedure include but are not limited to bleeding, infection, transfusion, damage to surrounding organs including bowel, bladder, ureters, nerves, vessels, need for repair or future surgery, inability to complete the procedure due to scar tissue or adhesions, possible laparotomy,unexpected complications, unexpected pathology, anesthesia risks, and rarely death.    Questions answered.  Consents signed.     She also has an unwanted scar on her umbilicus when she pierced her belly button herself as a teenager, she would like this fixed.     Cindy Woods D.O.

## 2021-03-07 NOTE — OP REPORT
DATE OF SERVICE:  3/7/2021     PREOPERATIVE DIAGNOSES:  1.  Intrauterine pregnancy at 39w2d  2.  Previous  section x1  3.  IUGR, resolved in pregnancy  4.  History of endometriosis confirmed by laparoscopy  5.  Umbilicus scar from piercing  6.  Request for permanent sterilization     POSTOPERATIVE DIAGNOSES:  1.  Intrauterine pregnancy at 39w2d  2.  same  3.  Visibly endometriosis throughout adnexa and on posterior portion of uterosacral ligaments up through the lower portion of the uterus    PROCEDURE PERFORMED:  Repeat low transverse  section, bilateral salpingectomy, abnormal umbilicus scar from previous piercing removed with scar revision.     SURGEON:  Cindy Woods DO     ASSISTANT:  Merrick Combs DO, PGY 1     ANESTHESIA:  Spinal.     ANESTHESIOLOGIST:  Goyo Longoria MD     SPECIMEN:  Placenta     ESTIMATED BLOOD LOSS:  1000mL     FINDINGS:  A live viable , weight 2750g, Apgars of 8 and 9 in vertex presentation with clear amniotic fluid.  Placenta was intact with 3 vessel cord.  There was endometriosis visible from the lower pelvis of the uterosacral ligaments bilaterally to the lower posterior portion of the uterus and also throughout the adnexa covering the ovaries bilaterally.  Normal tubes noted bilaterally removed.  Abnormal scar with a through and through 1 cm hole above umbilicus.     COMPLICATIONS:  None.     INDICATION FOR PROCEDURE: This is a 30-year-old  2 para 1-0-0-1 who presented to labor and delivery at 39+2 for her scheduled  section.  She requested permanent sterilization which was discussed at length in the office as well as confirmed today.  She has a previous umbilicus scar that had an undesired appearance and wanted to have it modified at the time of surgery.  The patient's pregnancy was complicated by severe IUGR that resolved by the end of pregnancy.  The patient was informed of the risks and benefits of the procedure.  Risks included but  were not limited to, bleeding, infection, injury to internal organs, and possible hysterectomy.  The patient expressed understanding the risks involved.  All questions were answered and the patient consented to the procedure.    PROCEDURE:  After appropriate consents were obtained, the patient was taken to the operating room where spinal anesthesia was applied without complications.  The patient was placed in the dorsal supine position with a left tilt of the hips.  The patient was then prepped and draped in the usual sterile manner and prophylactic antibiotics were administered.  Clamp test on the skin verified adequate anesthesia.  The surgery started with removal of the band of tissue that was where the umbilicus scar was located from a piercing.  This was performed with Metzenbaum scissors bilaterally and the excess tissue was discarded.  The superficial incision was closed with skin glue.  A low transverse incision was made with a scalpel and sharp dissection was carried out over subsequent layers of tissue including the fascia using the Bovie electrocautery for hemostasis.  The fascia was incised at the midline and the fascial incision was extended bilaterally using the curved Mae scissors.  The superior edge of the fascial incision was grasped with the Kocher clamps, tented up, and the underlying rectus muscles were dissected off bluntly and sharply using the curved Mae scissors.  Attention was turned to the inferior edge which was grasped with Kocher clamps tented up and the underlying rectus muscles were dissected bluntly and sharply using the curved Mae scissors.  The rectus muscles were divided at the midline and the peritoneum was identified and entered bluntly.  Care was taken to avoid the bladder as the peritoneal incision was extended superiorly and inferiorly.  The Paresh O retractor was inserted and the vesicouterine peritoneum was identified, elevated, and cut laterally to both sides using the  Metzenbaum scissors.  The bladder flap was created using blunt and sharp dissection with the Metzenbaum scissors until the bladder was far inferior from the incision site.  The bladder blade was reinserted and a transverse incision was made in the lower uterine segment using the scalpel.  The uterine incision was extended in a cephalocaudal fashion manually.  The amniotic sac was entered and the fluid was noted to be clear.  The surgeon's hand was placed into the uterine cavity and the fetal head was identified in the cephalic position.  The head was elevated to the uterine incision and delivered with the assistance of fundal pressure.  The  was examined for nuchal cord and there was none.  The  was delivered with the assistance of fundal pressure with ease.  On delivery the  was bulb suctioned and the cord was doubly clamped and cut.  The  was passed to the pediatricians for further care.  Cord segment for blood gases were obtained for analysis.  Oxytocin was administered by IV infusion to enhance uterine contraction.  The uterus was exteriorized and cleared of all clots and remaining products of conception.  The hysterotomy incision was reapproximated with 0 Vicryl suture in a running Lembert fashion.   See above for the findings of the uterus and adnexa.   The left tube was elevated with Piggott clamps and using a hand-held LigaSure device the entire tube was removed with excellent hemostasis along the mesosalpinx.  The same procedure was performed on the right with excellent hemostasis noted.  A larger bulb of endometriosis tissue was removed with the Bovie electrocautery and sent to pathology, this was overlying the left ovary.  The uterus was placed back in the abdomen. The pericolic gutters were examined and any blood clots were removed.   Figure-of-eight stitches were applied to the uterine incision with excellent hemostasis.  Luda was applied to the uterine incision and  bilateral adnexa.  Evaluation of the rectus muscles, fascia, and peritoneum noted that there was diffuse oozing throughout.  Using the Bovie electrocautery most was made hemostatic, and the remaining became hemostatic with Luda.  The fascia was reapproximated with 0 Vicryl suture in a running fashion.  The subcutaneous fat was irrigated and any small bleeders were bovied for hemostasis.  The subcutaneous fat was then reapproximated with 2-0 plain gut suture in a running fashion.  The skin was reapproximated with 4-0 Monocryl suture in a running subcuticular stitch. A dressing was placed.  Sponge, needle, instrument, and lap counts were correct x2.  Patient tolerated the procedure well and went to recovery room in stable condition.        ____________________________________     Cindy Woods,

## 2021-03-08 LAB
PATHOLOGY CONSULT NOTE: NORMAL
PATHOLOGY CONSULT NOTE: NORMAL

## 2021-03-08 PROCEDURE — 700111 HCHG RX REV CODE 636 W/ 250 OVERRIDE (IP): Performed by: STUDENT IN AN ORGANIZED HEALTH CARE EDUCATION/TRAINING PROGRAM

## 2021-03-08 PROCEDURE — 700102 HCHG RX REV CODE 250 W/ 637 OVERRIDE(OP): Performed by: OBSTETRICS & GYNECOLOGY

## 2021-03-08 PROCEDURE — A9270 NON-COVERED ITEM OR SERVICE: HCPCS | Performed by: STUDENT IN AN ORGANIZED HEALTH CARE EDUCATION/TRAINING PROGRAM

## 2021-03-08 PROCEDURE — 700102 HCHG RX REV CODE 250 W/ 637 OVERRIDE(OP): Performed by: STUDENT IN AN ORGANIZED HEALTH CARE EDUCATION/TRAINING PROGRAM

## 2021-03-08 PROCEDURE — A9270 NON-COVERED ITEM OR SERVICE: HCPCS | Performed by: NURSE PRACTITIONER

## 2021-03-08 PROCEDURE — A9270 NON-COVERED ITEM OR SERVICE: HCPCS | Performed by: OBSTETRICS & GYNECOLOGY

## 2021-03-08 PROCEDURE — 51798 US URINE CAPACITY MEASURE: CPT

## 2021-03-08 PROCEDURE — 700102 HCHG RX REV CODE 250 W/ 637 OVERRIDE(OP): Performed by: NURSE PRACTITIONER

## 2021-03-08 PROCEDURE — 770002 HCHG ROOM/CARE - OB PRIVATE (112)

## 2021-03-08 RX ORDER — OXYCODONE HYDROCHLORIDE AND ACETAMINOPHEN 5; 325 MG/1; MG/1
1 TABLET ORAL EVERY 4 HOURS PRN
Status: DISCONTINUED | OUTPATIENT
Start: 2021-03-08 | End: 2021-03-10 | Stop reason: HOSPADM

## 2021-03-08 RX ORDER — OXYCODONE AND ACETAMINOPHEN 10; 325 MG/1; MG/1
1 TABLET ORAL EVERY 4 HOURS PRN
Status: DISCONTINUED | OUTPATIENT
Start: 2021-03-08 | End: 2021-03-10 | Stop reason: HOSPADM

## 2021-03-08 RX ORDER — SIMETHICONE 80 MG
80 TABLET,CHEWABLE ORAL 3 TIMES DAILY PRN
Status: DISCONTINUED | OUTPATIENT
Start: 2021-03-08 | End: 2021-03-10 | Stop reason: HOSPADM

## 2021-03-08 RX ADMIN — KETOROLAC TROMETHAMINE 30 MG: 30 INJECTION, SOLUTION INTRAMUSCULAR; INTRAVENOUS at 09:53

## 2021-03-08 RX ADMIN — OXYCODONE HYDROCHLORIDE AND ACETAMINOPHEN 1 TABLET: 5; 325 TABLET ORAL at 16:09

## 2021-03-08 RX ADMIN — SIMETHICONE 80 MG: 80 TABLET, CHEWABLE ORAL at 15:09

## 2021-03-08 RX ADMIN — ACETAMINOPHEN 1000 MG: 500 TABLET, FILM COATED ORAL at 09:53

## 2021-03-08 RX ADMIN — IBUPROFEN 800 MG: 800 TABLET, FILM COATED ORAL at 23:04

## 2021-03-08 RX ADMIN — SIMETHICONE 80 MG: 80 TABLET, CHEWABLE ORAL at 23:04

## 2021-03-08 RX ADMIN — OXYCODONE 10 MG: 5 TABLET ORAL at 06:51

## 2021-03-08 RX ADMIN — OXYCODONE HYDROCHLORIDE AND ACETAMINOPHEN 1 TABLET: 10; 325 TABLET ORAL at 19:48

## 2021-03-08 RX ADMIN — ACETAMINOPHEN 1000 MG: 500 TABLET, FILM COATED ORAL at 02:56

## 2021-03-08 RX ADMIN — IBUPROFEN 800 MG: 800 TABLET, FILM COATED ORAL at 15:09

## 2021-03-08 RX ADMIN — KETOROLAC TROMETHAMINE 30 MG: 30 INJECTION, SOLUTION INTRAMUSCULAR; INTRAVENOUS at 02:57

## 2021-03-08 ASSESSMENT — EDINBURGH POSTNATAL DEPRESSION SCALE (EPDS)
I HAVE BEEN SO UNHAPPY THAT I HAVE HAD DIFFICULTY SLEEPING: NOT VERY OFTEN
THINGS HAVE BEEN GETTING ON TOP OF ME: YES, SOMETIMES I HAVEN'T BEEN COPING AS WELL AS USUAL
THE THOUGHT OF HARMING MYSELF HAS OCCURRED TO ME: HARDLY EVER
I HAVE LOOKED FORWARD WITH ENJOYMENT TO THINGS: AS MUCH AS I EVER DID
I HAVE BEEN SO UNHAPPY THAT I HAVE BEEN CRYING: ONLY OCCASIONALLY
I HAVE BLAMED MYSELF UNNECESSARILY WHEN THINGS WENT WRONG: YES, MOST OF THE TIME
I HAVE BEEN ANXIOUS OR WORRIED FOR NO GOOD REASON: YES, VERY OFTEN
I HAVE FELT SAD OR MISERABLE: NOT VERY OFTEN
I HAVE BEEN ABLE TO LAUGH AND SEE THE FUNNY SIDE OF THINGS: AS MUCH AS I ALWAYS COULD
I HAVE FELT SCARED OR PANICKY FOR NO GOOD REASON: YES, QUITE A LOT

## 2021-03-08 ASSESSMENT — PAIN DESCRIPTION - PAIN TYPE
TYPE: SURGICAL PAIN

## 2021-03-08 NOTE — ANESTHESIA POSTPROCEDURE EVALUATION
Patient: Shakila Salgado    Procedure Summary     Date: 21 Room / Location: [facility] OR 01 / SURGERY LABOR AND DELIVERY    Anesthesia Start: 1053 Anesthesia Stop: 1317    Procedure:  SECTION, REPEAT, WITH SALPINGECTOMY (Bilateral Abdomen) Diagnosis:        delivery delivered      ( delivery del)    Surgeons: Cindy Woods D.O. Responsible Provider: Goyo Longoria M.D.    Anesthesia Type: spinal ASA Status: 2          Final Anesthesia Type: spinal  Last vitals  BP   Blood Pressure: 114/71    Temp   36.6 °C (97.9 °F)    Pulse   84   Resp   16    SpO2   94 %      Anesthesia Post Evaluation    Patient location during evaluation: PACU  Patient participation: complete - patient participated  Level of consciousness: awake and alert    Airway patency: patent  Anesthetic complications: no  Cardiovascular status: hemodynamically stable  Respiratory status: acceptable  Hydration status: euvolemic    PONV: none          No complications documented.     Nurse Pain Score: 3 (NPRS)

## 2021-03-08 NOTE — CARE PLAN
Problem: Altered physiologic condition related to postoperative  delivery  Goal: Patient physiologically stable as evidenced by normal lochia, palpable uterine involution and vital signs within normal limits  Outcome: PROGRESSING AS EXPECTED     Problem: Potential for postpartum infection related to surgical incision, compromised uterine condition, urinary tract or respiratory compromise  Goal: Patient will be afebrile and free from signs and symptoms of infection  Outcome: PROGRESSING AS EXPECTED     Problem: Alteration in comfort related to surgical incision and/or after birth pains  Goal: Patient is able to ambulate, care for self and infant with acceptable pain level  Outcome: PROGRESSING AS EXPECTED  Goal: Patient verbalizes acceptable pain level  Outcome: PROGRESSING AS EXPECTED

## 2021-03-08 NOTE — PROGRESS NOTES
Assessment completed. Abdominal incision with mepilex dressing; dry and intact. Plan of care reviewed; verbalized understanding. Medicated with scheduled pain meds. Encouraged to call if needed additional pain intervention.

## 2021-03-08 NOTE — LACTATION NOTE
This note was copied from a baby's chart.  Baby 39.2 wks, IUGR, weight loss 3%, , MOB Hx anxious, traumatic experience with 1st baby= Coarctation of the Aorta- unable to breastfeed. MOB has decided to Pump & Provide this baby. Pump settings reviewed speed 80 decrease to 60 after 2 minutes, suction 30-35% x 15 minutes then hand express & syringe/spoon feed back. MOB did watch [email or web address] hand expression video. MOB was able to express 1 ml then LC provided demo on syring feeding back. Reviewed Supplemental Guidelines with mother, slow pace bottle fed 9 ml of Similac, LC provided demo. MOB understands she will feed on cue, according to guideline volumes- baby needs to feed no longer than 4 hours from last feed. Encouraged mother to rent HG pump for home.     Information sheet provided with review:  1. Storage & Prep of [employer], CDC  2. HG pump rental  3. Pump Schedule  4. Breastfeeding Support & Zoom  5. Supplemental Guidelines 10-20-

## 2021-03-08 NOTE — PROGRESS NOTES
Post Partum Progress Note    Name:   Shakila Salgado   Date/Time:  3/8/2021 - 6:56 AM  Chief Admitting Dx:  Labor and delivery, indication for care [O75.9]  Delivery Type:  vaginal, spontaneous  Post-Op/Post Partum Days #:  1    Subjective:  Abdominal pain: no  Ambulating:   yes  Tolerating liquids:  yes  Tolerating food:  yes common adult  Flatus:   yes  BM:    no  Bleeding:   without any bleeding  Voiding:   Not yet- ramires removed 6 hours ago. Bladder scan with 458ml. Straight cath now, then monitor for 4 more hours. If no void, will need to replace ramires.  Dizziness:   no  Feeding:   both breast and bottle - similac    Vitals:    03/08/21 0100 03/08/21 0220 03/08/21 0300 03/08/21 0547   BP:  109/69  114/74   Pulse: 74 79 76 73   Resp: 18 18  18   Temp:  36.7 °C (98 °F)  36.8 °C (98.2 °F)   TempSrc:  Temporal  Temporal   SpO2: 96% 95% 96% 96%   Weight:       Height:           Exam:  Breast: Tenderness no, Engorged no and Lactating yes  Abdomen: Abdomen soft, non-tender. BS normal. No masses,  No organomegaly  Fundal Tenderness:  no  Fundus Firm: yes  Incision: dry and intact  Below umbilicus: yes  Perineum: perineum intact  Lochia: mild  Extremities: Normal, no cyanosis, clubbing, 1+ edema extremities, peripheral pulses and reflexes normal, no edema, redness or tenderness in the calves or thighs, Homans sign is negative, no sign of DVT, feet normal, good pulses, normal color, temperature and sensation    Meds:  Current Facility-Administered Medications   Medication Dose   • simethicone (MYLICON) chewable tab 80 mg  80 mg   • lactated ringers (LR) infusion     • LR infusion     • oxytocin (PITOCIN) infusion (for postpartum)  2,000 mL/hr    Followed by   • oxytocin (PITOCIN) infusion (for postpartum)   mL/hr   • miSOPROStol (CYTOTEC) tablet 800 mcg  800 mcg   • HYDROmorphone pf (DILAUDID) injection 0.2 mg  0.2 mg   • HYDROmorphone pf (DILAUDID) injection 0.4 mg  0.4 mg   • oxyCODONE immediate-release  (ROXICODONE) tablet 5 mg  5 mg   • oxyCODONE immediate-release (ROXICODONE) tablet 10 mg  10 mg   • acetaminophen (TYLENOL) tablet 1,000 mg  1,000 mg   • ketorolac (TORADOL) injection 30 mg  30 mg   • ePHEDrine injection 10 mg  10 mg   • ondansetron (ZOFRAN) syringe/vial injection 4 mg  4 mg   • diphenhydrAMINE (BENADRYL) injection 12.5 mg  12.5 mg   • naloxone (NARCAN) 0.4 mg in NS 1,000 mL infusion  0.25 mcg/kg/hr   • LR infusion     • PRN oxytocin (PITOCIN) (20 Units/1000 mL) PRN for excessive uterine bleeding - See Admin Instr  125-999 mL/hr   • acetaminophen (Tylenol) tablet 650 mg  650 mg   • ibuprofen (MOTRIN) tablet 800 mg  800 mg       Labs:   Recent Labs     /  0900 21  2054   WBC 9.6 15.8*   RBC 3.89* 3.20*   HEMOGLOBIN 11.4* 9.6*   HEMATOCRIT 35.6* 30.2*   MCV 91.5 94.4   MCH 29.3 30.0   MCHC 32.0* 31.8*   RDW 47.4 49.4   PLATELETCT 270 249   MPV 11.1 10.9       Assessment:  Chief Admitting Dx:  Labor and delivery, indication for care [O75.9]  Delivery Type:   for repeat  Tubal Ligation:  yes    Plan:  Continue routine post partum care.  Start simethicone for gas pain  Straight cath now for residual urine, and check for void for 4 hours. If not results, will need ramires replaced  Anticipate discharge POD#3    Erika Burciaga C.N.M.

## 2021-03-09 PROCEDURE — 700102 HCHG RX REV CODE 250 W/ 637 OVERRIDE(OP): Performed by: NURSE PRACTITIONER

## 2021-03-09 PROCEDURE — A9270 NON-COVERED ITEM OR SERVICE: HCPCS | Performed by: NURSE PRACTITIONER

## 2021-03-09 PROCEDURE — A9270 NON-COVERED ITEM OR SERVICE: HCPCS | Performed by: STUDENT IN AN ORGANIZED HEALTH CARE EDUCATION/TRAINING PROGRAM

## 2021-03-09 PROCEDURE — 700102 HCHG RX REV CODE 250 W/ 637 OVERRIDE(OP): Performed by: STUDENT IN AN ORGANIZED HEALTH CARE EDUCATION/TRAINING PROGRAM

## 2021-03-09 PROCEDURE — 700102 HCHG RX REV CODE 250 W/ 637 OVERRIDE(OP): Performed by: OBSTETRICS & GYNECOLOGY

## 2021-03-09 PROCEDURE — A9270 NON-COVERED ITEM OR SERVICE: HCPCS | Performed by: OBSTETRICS & GYNECOLOGY

## 2021-03-09 PROCEDURE — 770002 HCHG ROOM/CARE - OB PRIVATE (112)

## 2021-03-09 RX ORDER — FUROSEMIDE 20 MG/1
10 TABLET ORAL ONCE
Status: COMPLETED | OUTPATIENT
Start: 2021-03-09 | End: 2021-03-09

## 2021-03-09 RX ADMIN — IBUPROFEN 800 MG: 800 TABLET, FILM COATED ORAL at 08:45

## 2021-03-09 RX ADMIN — IBUPROFEN 800 MG: 800 TABLET, FILM COATED ORAL at 23:08

## 2021-03-09 RX ADMIN — IBUPROFEN 800 MG: 800 TABLET, FILM COATED ORAL at 15:09

## 2021-03-09 RX ADMIN — SIMETHICONE 80 MG: 80 TABLET, CHEWABLE ORAL at 11:16

## 2021-03-09 RX ADMIN — OXYCODONE HYDROCHLORIDE AND ACETAMINOPHEN 1 TABLET: 10; 325 TABLET ORAL at 05:58

## 2021-03-09 RX ADMIN — OXYCODONE HYDROCHLORIDE AND ACETAMINOPHEN 1 TABLET: 10; 325 TABLET ORAL at 20:18

## 2021-03-09 RX ADMIN — OXYCODONE HYDROCHLORIDE AND ACETAMINOPHEN 1 TABLET: 10; 325 TABLET ORAL at 14:38

## 2021-03-09 RX ADMIN — FUROSEMIDE 10 MG: 20 TABLET ORAL at 09:29

## 2021-03-09 RX ADMIN — OXYCODONE HYDROCHLORIDE AND ACETAMINOPHEN 1 TABLET: 10; 325 TABLET ORAL at 01:59

## 2021-03-09 RX ADMIN — OXYCODONE HYDROCHLORIDE AND ACETAMINOPHEN 1 TABLET: 5; 325 TABLET ORAL at 10:07

## 2021-03-09 ASSESSMENT — EDINBURGH POSTNATAL DEPRESSION SCALE (EPDS)
I HAVE BLAMED MYSELF UNNECESSARILY WHEN THINGS WENT WRONG: YES, MOST OF THE TIME
I HAVE FELT SCARED OR PANICKY FOR NO GOOD REASON: YES, QUITE A LOT
THINGS HAVE BEEN GETTING ON TOP OF ME: YES, SOMETIMES I HAVEN'T BEEN COPING AS WELL AS USUAL
I HAVE BEEN SO UNHAPPY THAT I HAVE BEEN CRYING: ONLY OCCASIONALLY
I HAVE LOOKED FORWARD WITH ENJOYMENT TO THINGS: AS MUCH AS I EVER DID
THE THOUGHT OF HARMING MYSELF HAS OCCURRED TO ME: NEVER
I HAVE BEEN ANXIOUS OR WORRIED FOR NO GOOD REASON: YES, VERY OFTEN
I HAVE BEEN SO UNHAPPY THAT I HAVE HAD DIFFICULTY SLEEPING: NOT VERY OFTEN
I HAVE BEEN ABLE TO LAUGH AND SEE THE FUNNY SIDE OF THINGS: AS MUCH AS I ALWAYS COULD
I HAVE FELT SAD OR MISERABLE: NOT VERY OFTEN

## 2021-03-09 ASSESSMENT — PATIENT HEALTH QUESTIONNAIRE - PHQ9
SUM OF ALL RESPONSES TO PHQ9 QUESTIONS 1 AND 2: 0
1. LITTLE INTEREST OR PLEASURE IN DOING THINGS: NOT AT ALL
2. FEELING DOWN, DEPRESSED, IRRITABLE, OR HOPELESS: NOT AT ALL

## 2021-03-09 ASSESSMENT — PAIN DESCRIPTION - PAIN TYPE
TYPE: ACUTE PAIN
TYPE: SURGICAL PAIN
TYPE: SURGICAL PAIN

## 2021-03-09 NOTE — CARE PLAN
Problem: Potential for postpartum infection related to surgical incision, compromised uterine condition, urinary tract or respiratory compromise  Goal: Patient will be afebrile and free from signs and symptoms of infection  Outcome: PROGRESSING AS EXPECTED  Note: Pt VS WDL will continue to monitor for S/S of infection.      Problem: Alteration in comfort related to surgical incision and/or after birth pains  Goal: Patient verbalizes acceptable pain level  Outcome: PROGRESSING AS EXPECTED  Note: Pain controlled with prn medications per mar. Pt will call to request pain medications. Will offer pain medications as they become available. Pain management expectations discussed with pt, plan made for the day regarding how to address pain.      Problem: Potential anxiety related to difficulty adapting to parental role  Goal: Patient will verbalize and demonstrate effective bonding and parenting behavior  Outcome: PROGRESSING AS EXPECTED  Note: Pt able to care for infant, FOB at bedside helping with infant cares.

## 2021-03-09 NOTE — PROGRESS NOTES
Obstetrics & Gynecology Post-Delivery Progress Note    Date of Service      30 y.o.  s/p  for repeat  Delivery date: 3/7/2021    Events  acute urinary retention - resolved    Subjective  Pain: Yes,  controlled  Bleeding: lochia minimal  Tolerating PO: yes  Voiding: without difficulty  Ambulating: yes  Passing flatus: Yes  Feeding: breastfeeding with formula supplementation     Objective  24hr VS:  Temp:  [36.7 °C (98.1 °F)-36.9 °C (98.4 °F)] 36.9 °C (98.4 °F)  Pulse:  [75-82] 79  Resp:  [16-19] 17  BP: (109-117)/(72-81) 109/72  SpO2:  [96 %] 96 %    Physical Exam  General: well  Chest/Breasts: nipples intact   Abdomen: minimal tenderness, soft, non-distended  Fundus: firm, below umbilicus and nontender  Incision: dressing clean, dry, intact  Perineum: deferred  Extremities: symmetric and 1+ edema, calves nontender    Labs:  Recent Labs     21  0900 21   WBC 9.6 15.8*   RBC 3.89* 3.20*   HEMOGLOBIN 11.4* 9.6*   HEMATOCRIT 35.6* 30.2*   MCV 91.5 94.4   MCH 29.3 30.0   RDW 47.4 49.4   PLATELETCT 270 249   MPV 11.1 10.9   NEUTSPOLYS 52.20  --    LYMPHOCYTES 34.80  --    MONOCYTES 9.20  --    EOSINOPHILS 2.90  --    BASOPHILS 0.40  --          Medications  ibuprofen, 800 mg, Oral, Q8HRS      PRN medications: simethicone, oxyCODONE-acetaminophen, oxyCODONE-acetaminophen, misoprostol, LR, oxytocin, acetaminophen      Assessment/Plan  Shakila Salgado is a 30 y.o.yo  s/p postop day #2  s/p  for repeat    - Post care: meeting all goals  - Pain: controlled  - Rh+, Rubella Immune  - Method of Feeding: plans to breastfeed  - Method of Contraception: will discuss at time of discharge   VTE prophylaxis: SCDs    - Disposition: likely home postop day 3

## 2021-03-09 NOTE — LACTATION NOTE
This note was copied from a baby's chart.  Follow-up visit, MOB continues to pump & provide, last yield 8-10 ml of colostrum. MOB plans to rent HG pump for home today. MOB comfortable with going home & feeding baby appropriate volumes using Supplemental Guideline volumes.

## 2021-03-09 NOTE — DISCHARGE PLANNING
Discharge Planning Assessment Post Partum    Reason for Referral: MOB scored a 14 on the EPDS screen  Address: [address] [place] [address] [place] [address]  Phone: [phone]  Type of Living Situation: living with FOB  Mom Diagnosis: Pregnancy,   Baby Diagnosis: [place]-39.2 weeks  Primary Language: English    Name of Baby: Florencio Reynolds (: 3/7/21)  Father of the Baby: Austin Reynolds   Involved in baby’s care? Yes  Contact Information: [phone]    Prenatal Care: Yes  Mom's PCP: None  PCP for new baby: Pediatrician list provided to mother    Support System: FOB  Coping/Bonding between mother & baby: Yes  Source of Feeding: breast and bottle feeding  Supplies for Infant: prepared for infant; denies any needs    Mom's Insurance: [employer]  Baby Covered on Insurance:Yes  Mother Employed/School: [employer]  Other children in the home/names & ages: 9 year old daughter-Cherry    Financial Hardship/Income: denies   Mom's Mental status: alert and oriented  Services used prior to admit: none    CPS History: No  Psychiatric History: None-MOB scored a 14 on the EPDS screen.  MOB denies any current symptoms of depression or anxiety and stated she is feeling good.  Discussed post partum depression and offered resources, however MOB declined needing them.  Domestic Violence History: No  Drug/ETOH History: No    Resources Provided: pediatrician list provided to mother  Referrals Made: none     Clearance for Discharge: Infant is cleared to discharge home with parents

## 2021-03-09 NOTE — PROGRESS NOTES
3115-- Received report from SUSAN Alves, Infant at bedside in open crib no signs of distress.  Pt resting in bed. Discussed pain management for the day.  No further needs at the time.  Call light within reach, bed locked and in lowest position.  Rounding in place.    0830-- Assessment completed, VSS, Pt given Scheduled pain medication at this time.  Talked with pt about depression screening and answering questions based on her feelings the last 7 days.  Pt changed the answer to the last question on self harm, changed answer to never.  Pt seemed nervous, Infant is spitty after feeding.  Educated POB and burping infant, proper feeding amounts according to age in days, and bulb suctioning infant.  POB verbalized understanding.  Discussed plan of care for the day that pt is comfortable with.  All questions answered at this time.  Will continue to monitor.     4254- Notified Dr. Susan Mendez on pt being a little anxious and depression screening score of 14.  No new orders at this time.

## 2021-03-09 NOTE — CARE PLAN
Problem: Communication  Goal: The ability to communicate needs accurately and effectively will improve  Outcome: PROGRESSING AS EXPECTED  Note: Plan of care reviewed including provider roles, health history, IV and medications, labs and tests, and discharge planning. Patient assured that they may ask questions at any time and should always let staff know if they are having difficulty breathing, pain or any discomfort at any time     Problem: Potential anxiety related to difficulty adapting to parental role  Goal: Patient will verbalize and demonstrate effective bonding and parenting behavior  Outcome: PROGRESSING SLOWER THAN EXPECTED  Note: Patient has a lot of anxiety r/t hx with first child, this nurse has slowly taught her to recognize normal  behavior for which she is unfamiliar

## 2021-03-10 ENCOUNTER — PHARMACY VISIT (OUTPATIENT)
Dept: PHARMACY | Facility: MEDICAL CENTER | Age: 31
End: 2021-03-10
Payer: COMMERCIAL

## 2021-03-10 VITALS
HEART RATE: 77 BPM | RESPIRATION RATE: 17 BRPM | WEIGHT: 202 LBS | TEMPERATURE: 98.1 F | HEIGHT: 65 IN | SYSTOLIC BLOOD PRESSURE: 120 MMHG | BODY MASS INDEX: 33.66 KG/M2 | DIASTOLIC BLOOD PRESSURE: 81 MMHG | OXYGEN SATURATION: 95 %

## 2021-03-10 PROBLEM — Z98.891 S/P CESAREAN SECTION: Status: ACTIVE | Noted: 2021-03-10

## 2021-03-10 PROCEDURE — RXMED WILLOW AMBULATORY MEDICATION CHARGE: Performed by: NURSE PRACTITIONER

## 2021-03-10 PROCEDURE — A9270 NON-COVERED ITEM OR SERVICE: HCPCS | Performed by: NURSE PRACTITIONER

## 2021-03-10 PROCEDURE — RXMED WILLOW AMBULATORY MEDICATION CHARGE: Performed by: OBSTETRICS & GYNECOLOGY

## 2021-03-10 PROCEDURE — A9270 NON-COVERED ITEM OR SERVICE: HCPCS | Performed by: OBSTETRICS & GYNECOLOGY

## 2021-03-10 PROCEDURE — 700102 HCHG RX REV CODE 250 W/ 637 OVERRIDE(OP): Performed by: OBSTETRICS & GYNECOLOGY

## 2021-03-10 PROCEDURE — A9270 NON-COVERED ITEM OR SERVICE: HCPCS | Performed by: STUDENT IN AN ORGANIZED HEALTH CARE EDUCATION/TRAINING PROGRAM

## 2021-03-10 PROCEDURE — 700102 HCHG RX REV CODE 250 W/ 637 OVERRIDE(OP): Performed by: STUDENT IN AN ORGANIZED HEALTH CARE EDUCATION/TRAINING PROGRAM

## 2021-03-10 PROCEDURE — 700102 HCHG RX REV CODE 250 W/ 637 OVERRIDE(OP): Performed by: NURSE PRACTITIONER

## 2021-03-10 RX ORDER — DOCUSATE SODIUM 100 MG/1
100 CAPSULE, LIQUID FILLED ORAL 2 TIMES DAILY
Qty: 60 CAPSULE | Refills: 4 | Status: SHIPPED | OUTPATIENT
Start: 2021-03-10

## 2021-03-10 RX ORDER — OXYCODONE HYDROCHLORIDE AND ACETAMINOPHEN 5; 325 MG/1; MG/1
1 TABLET ORAL EVERY 4 HOURS PRN
Qty: 20 TABLET | Refills: 0 | Status: SHIPPED | OUTPATIENT
Start: 2021-03-10 | End: 2021-03-10

## 2021-03-10 RX ORDER — OXYCODONE HYDROCHLORIDE 5 MG/1
5 TABLET ORAL EVERY 4 HOURS PRN
Qty: 20 TABLET | Refills: 0 | Status: SHIPPED | OUTPATIENT
Start: 2021-03-10 | End: 2021-03-17

## 2021-03-10 RX ORDER — IBUPROFEN 800 MG/1
800 TABLET ORAL EVERY 8 HOURS
Qty: 60 TABLET | Refills: 4 | Status: SHIPPED | OUTPATIENT
Start: 2021-03-10

## 2021-03-10 RX ORDER — FERROUS SULFATE 325(65) MG
325 TABLET ORAL 2 TIMES DAILY
Qty: 60 TABLET | Refills: 4 | Status: SHIPPED | OUTPATIENT
Start: 2021-03-10

## 2021-03-10 RX ADMIN — OXYCODONE HYDROCHLORIDE AND ACETAMINOPHEN 1 TABLET: 10; 325 TABLET ORAL at 02:14

## 2021-03-10 RX ADMIN — OXYCODONE HYDROCHLORIDE AND ACETAMINOPHEN 1 TABLET: 5; 325 TABLET ORAL at 07:32

## 2021-03-10 RX ADMIN — IBUPROFEN 800 MG: 800 TABLET, FILM COATED ORAL at 07:32

## 2021-03-10 RX ADMIN — OXYCODONE HYDROCHLORIDE AND ACETAMINOPHEN 1 TABLET: 10; 325 TABLET ORAL at 11:21

## 2021-03-10 RX ADMIN — SIMETHICONE 80 MG: 80 TABLET, CHEWABLE ORAL at 11:25

## 2021-03-10 NOTE — CARE PLAN
Problem: Alteration in comfort related to surgical incision and/or after birth pains  Goal: Patient is able to ambulate, care for self and infant with acceptable pain level  Outcome: PROGRESSING AS EXPECTED  Note: Pt is ambulating with a steady gait. She has ambulated in the hallways and down to the cafeteria and back without requiring assistance.   Goal: Patient verbalizes acceptable pain level  Outcome: PROGRESSING AS EXPECTED  Note: Reviewed 0-10 pain scale and available pain medication with the pt. Pt will call for pain medicine as needed.

## 2021-03-10 NOTE — PROGRESS NOTES
Discharge education, follow up and prescription information provided to pt via verbal and paper handout, pt expresses understanding.

## 2021-03-10 NOTE — LACTATION NOTE
This note was copied from a baby's chart.  Follow-up visit, weight loss 4%, Couplet to be discharged today. HEAVEN is pumping & providing per her own  Choice. HEAVEN attempted to rent HG pump through [facility], however they are out of Medela pump rental. MOB given [facility] phone number to check there for Medela pump. HEAVEN has been pumping 25-30 ounces per pump session.

## 2021-03-10 NOTE — DISCHARGE PLANNING
Meds-to-Beds: Discharge prescription orders listed below delivered to patient's bedside. RN notified. Patient counseled. Patient elected to have co-payment billed to patient account. Patient presented valid photo ID.      Shakila Salgado   [facility] Medication Instructions KEISHA:[id number]    Printed on:03/10/21 6118   Medication Information                      docusate sodium (COLACE) 100 MG Cap  Take 1 capsule by mouth 2 times a day.             ferrous sulfate 325 (65 Fe) MG tablet  Take 1 tablet by mouth 2 Times a Day.             ibuprofen (MOTRIN) 800 MG Tab  Take 1 tablet by mouth every 8 hours.             oxyCODONE immediate-release (ROXICODONE) 5 MG Tab  Take 1 tablet by mouth every four hours as needed for Severe Pain for up to 7 days.               Shanthi Villela, PharmD

## 2021-03-10 NOTE — PROGRESS NOTES
Report received from Terri DAVIS. Pt assessment complete. Pt states pain at a 6-7; percocet given. Pt has been ambulating a lot today and states her incision feels sore and achy. Incision dressing is C,D,I. Pt is pumping and bottle feeding expressed BM to baby. Pt states her breasts to feel sore and tender. Advised pt to only increase suction on the breast pump to her comfort. MD has ordered pt to wear Gabe hose for swelling in lower extremities. Gabe hose brought in and pt requested to put them on herself. Reviewed POC with pt. Pt has no questions at this time. Will continue postpartum care.

## 2021-03-10 NOTE — DISCHARGE SUMMARY
"  Discharge Summary:      Shakila Salgado    Admit Date:   3/7/2021  Discharge Date:  3/10/2021     Admitting diagnosis:  Labor and delivery, indication for care [O75.9]  Discharge Diagnosis: Status post  for repeat  Pregnancy Complications: IUGR  Tubal Ligation:  yes        History:  Past Medical History:   Diagnosis Date   • Allergy     seasonal allergys.    • Anemia    • Asthma for \"all her life\", stable on advair and uses rescue albuterol inhaler PRN 10/4/2011    Last Attack, 12yrs old   • Eczema     dx as a child.    • Urinary tract infection, site not specified          OB History    Para Term  AB Living   2 2 2 0 0 2   SAB TAB Ectopic Molar Multiple Live Births   0 0 0 0 0 2      # Outcome Date GA Lbr Fredrick/2nd Weight Sex Delivery Anes PTL Lv   2 Term 21 39w2d  2.75 kg (6 lb 1 oz) M CS-LTranv Spinal N GARY   1 Term 12   3.402 kg (7 lb 8 oz) F CS-LTranv Spinal N GARY      Complications: Heart disease, Breech presentation        Penicillins and Food  Patient Active Problem List    Diagnosis Date Noted   • Poor fetal growth affecting management of mother in third trimester 2021   • Hx of  section 2021   • Breech presentation,  section on 2012   • Pericardial effusion- fetal 2012   • Asthma for \"all her life\", stable on advair and uses rescue albuterol inhaler PRN 10/04/2011        Hospital Course:   30 y.o. , now para 2, was admitted with the above mentioned diagnosis, underwent Repeat  repeat,  for repeat. Patient postpartum course was unremarkable, with progressive advancement in diet , ambulation and toleration of oral analgesia. Patient without complaints today and desires discharge.      Vitals:    21 1000 21 1800 21 0600 21 1800   BP: 115/76 117/81 109/72 127/70   Pulse: 75 82 79 83   Resp: 16 19 17 17   Temp: 36.9 °C (98.4 °F) 36.7 °C (98.1 °F) 36.9 °C (98.4 °F) " 36.9 °C (98.5 °F)   TempSrc: Temporal Temporal Temporal Temporal   SpO2: 96% 96% 96% 98%   Weight:       Height:           Current Facility-Administered Medications   Medication Dose   • simethicone (MYLICON) chewable tab 80 mg  80 mg   • oxyCODONE-acetaminophen (PERCOCET) 5-325 MG per tablet 1 tablet  1 tablet   • oxyCODONE-acetaminophen (PERCOCET-10)  MG per tablet 1 tablet  1 tablet   • miSOPROStol (CYTOTEC) tablet 800 mcg  800 mcg   • LR infusion     • PRN oxytocin (PITOCIN) (20 Units/1000 mL) PRN for excessive uterine bleeding - See Admin Instr  125-999 mL/hr   • acetaminophen (Tylenol) tablet 650 mg  650 mg   • ibuprofen (MOTRIN) tablet 800 mg  800 mg       Exam:  Breast Exam: negative  Abdomen: Abdomen soft, non-tender. BS normal. No masses,  No organomegaly  Fundus Non Tender: yes  Incision: dry and intact  Perineum: perineum intact  Extremity: extremities, peripheral pulses and reflexes normal, no edema, redness or tenderness in the calves or thighs     Labs:  Recent Labs     03/07/21  0900 03/07/21  2054   WBC 9.6 15.8*   RBC 3.89* 3.20*   HEMOGLOBIN 11.4* 9.6*   HEMATOCRIT 35.6* 30.2*   MCV 91.5 94.4   MCH 29.3 30.0   MCHC 32.0* 31.8*   RDW 47.4 49.4   PLATELETCT 270 249   MPV 11.1 10.9        Activity:   Discharge to home  Pelvic Rest x 6 weeks    Assessment:  normal postpartum course  Discharge Assessment: No areas of skin breakdown/redness; surgical incision intact/healing     [facility]: [facility] or [facility] in 5 weeks for vaginal ; 1 week for incision check.   Ferrous sulfate w/ d/c.      Discharge Meds:   Current Outpatient Medications   Medication Sig Dispense Refill   • ibuprofen (MOTRIN) 800 MG Tab Take 1 tablet by mouth every 8 hours. 60 tablet 4   • oxyCODONE-acetaminophen (PERCOCET) 5-325 MG Tab Take 1 tablet by mouth every four hours as needed for up to 14 days. 20 tablet 0   • ferrous sulfate 325 (65 Fe) MG tablet Take 1 tablet by mouth 2 Times a Day. 60 tablet 4   • docusate  sodium (COLACE) 100 MG Cap Take 1 capsule by mouth 2 times a day. 60 capsule 4       VASQUEZ Null.

## 2021-03-10 NOTE — DISCHARGE INSTRUCTIONS
Discharge Instructions    Discharged to home by car with relative. Discharged via wheelchair, hospital escort: Yes.  Special equipment needed: Not Applicable    Be sure to schedule a follow-up appointment with your primary care doctor or any specialists as instructed.     Discharge Plan:   Influenza Vaccine Indication: Patient Refuses    I understand that a diet low in cholesterol, fat, and sodium is recommended for good health. Unless I have been given specific instructions below for another diet, I accept this instruction as my diet prescription.       Special Instructions: None    · Is patient discharged on Warfarin / Coumadin?   No     Depression / Suicide Risk    As you are discharged from this [facility] facility, it is important to learn how to keep safe from harming yourself.    Recognize the warning signs:  · Abrupt changes in personality, positive or negative- including increase in energy   · Giving away possessions  · Change in eating patterns- significant weight changes-  positive or negative  · Change in sleeping patterns- unable to sleep or sleeping all the time   · Unwillingness or inability to communicate  · Depression  · Unusual sadness, discouragement and loneliness  · Talk of wanting to die  · Neglect of personal appearance   · Rebelliousness- reckless behavior  · Withdrawal from people/activities they love  · Confusion- inability to concentrate     If you or a loved one observes any of these behaviors or has concerns about self-harm, here's what you can do:  · Talk about it- your feelings and reasons for harming yourself  · Remove any means that you might use to hurt yourself (examples: pills, rope, extension cords, firearm)  · Get professional help from the community (Mental Health, Substance Abuse, psychological counseling)  · Do not be alone:Call your Safe Contact- someone whom you trust who will be there for you.  · Call your local CRISIS HOTLINE 628-4108 or 791-567-3324  · Call your local  Children's Mobile Crisis Response Team [place] (300) 740-9929 or www.CoinSeed  · Call the toll free National Suicide Prevention Hotlines   · National Suicide Prevention Lifeline 716-936-FDYZ (8498)  · edPULSE Line Network 800-SUICIDE (623-5910)        PATIENT DISCHARGE EDUCATION INSTRUCTION SHEET  REASONS TO CALL YOUR PEDIATRICIAN  · Projectile or forceful vomiting for more than one feeding  · Unusual rash lasting more than 24 hours  · Very sleepy, difficult to wake up  · Bright yellow or pumpkin colored skin with extreme sleepiness  · Temperature below 97.6 or above 100.4 F rectally  · Feeding problems  · Breathing problems  · Excessive crying with no known cause  · If cord starts to become red, swollen, develops a smell or discharge  · No wet diaper or stool in a 24 hour time period     REASONS TO CALL YOUR OBSTETRICIAN  · Persistent fever, shaking, chills (Temperature higher than 100.4) may indicate you have an infection  · Heavy bleeding: soaking more than 1 pad per hour; Passing clots an egg-sized clot or bigger may mean you have an postpartum hemorrhage  · Foul odor from vagina or bad smelling or discolored discharge or blood  · Breast infection (Mastitis symptoms); breast pain, chills, fever, redness or red streaks, may feel flu like symptoms  · Urinary pain, burning or frequency  · Incision that is not healing, increased redness, swelling, tenderness or pain, or any pus from episiotomy or  site may mean you have an infection  · Redness, swelling, warmth, or painful to touch in the calf area of your leg may mean you have a blood clot  · Severe or intensified depression, thoughts or feelings of wanting to hurt yourself or someone else   · Pain in chest, obstructed breathing or shortness of breath (trouble catching your breath) may mean you are having a postpartum complication. Call your provider immediately   · Headache that does not get better, even after taking medicine, a bad  headache with vision changes or pain in the upper right area of your belly may mean you have high blood pressure or post birth preeclampsia. Call your provider immediately    SAFE SLEEP POSITIONING FOR YOUR BABY  The American Academy for Pediatrics advises your baby should be placed on his/her back for Sleeping to reduce the risk of Sudden Infant Death Syndrome (SIDS)  · Baby should sleep by themselves in a crib, portable crib or bassinet  · Baby should not share a bed with his/her parents  · Baby should be placed on his or her back to sleep, night time and at naps  · Baby should sleep on firm mattress with a tightly fitted sheet  · NO couches, waterbeds or anything soft  · Baby's sleep area should not contain any loose blankets, comforters, stuffed animals or any other soft items, (pillows, bumper pads, etc. ...)  · Baby's face should be kept uncovered at all times  · Baby should sleep in a smoke-free environment  · Do not dress baby too warmly to prevent overheating    HAND WASHING  All family and friends should wash their hands:  · Before and after holding the baby  · Before feeding the baby  · After using the restroom or changing the baby's diaper     CARE    TAKING BABY'S TEMPERATURE  · If you feel your baby may have a fever take your baby's temperature per thermometer instructions  · If taking axillary temperature place thermometer under baby's armpit and hold arm close to body  · The most precise and accurate way to take a temperature is rectally  · Turn on the digital thermometer and lubricate the tip of the thermometer with petroleum jelly.  · Lay your baby or child on his or her back, lift his or her thighs, and insert the lubricated thermometer 1/2 to 1 inch (1.3 to 2.5 centimeters) into the rectum  · Call your Pediatrician for temperature lower than 97.6 or greater than 100.4 F rectally    BATHE AND SHAMPOO BABY  · Gently wash baby with a soft cloth using warm water and mild soap - rinse  well  · Do not put baby in tub bath until umbilical cord falls off and appears well-healed  · Bathing baby 2-3 times a week might be enough until your baby becomes more mobile. Bathing your baby too much can dry out his or her skin     NAIL CARE  · First recommendation is to keep them covered to prevent facial scratching  · During the first few weeks,  nails are very soft. Doctors recommend using only a fine emery board. Don't bite or tear your baby's nails. When your baby's nails are stronger, after a few weeks, you can switch to clippers or scissors making sure not to cut too short and nip the quick   · A good time for nail care is while your baby is sleeping and moving less    CORD CARE  · Fold diaper below umbilical cord until cord falls off  · Keep umbilical cord clean and dry  · May see a small amount of crust around the base of the cord. Clean off with mild soap and water and dry             DIAPER AND DRESS BABY  · For baby girls: gently wipe from front to back. Mucous or pink tinged drainage is normal  · For uncircumcised baby boys: do NOT pull back the foreskin to clean the penis. Gently clean with wipes or warm, soapy water  · Dress baby in one more layer of clothing than you are wearing  · Use a hat to protect from sun or cold. NO ties or drawstrings    URINATION AND BOWEL MOVEMENTS  · If formula feeding or when breast milk feeding is established, your baby should wet 6-8 diapers a day and have at least 2 bowel movements a day during the first month  · Bowel movements color and type can vary from day to day    CIRCUMCISION  What to watch out for:  · Foul smelling discharge  · Fever  · Swelling   · Crusty, fluid filled sores  · Trouble urinating   · Persistent bleeding or more than a quarter size spot of blood on his diaper  · Yellow discharge lasting more than a week  · Continue with care procedures until healed or have a visit with your Pediatrician     INFANT FEEDING  · Most newborns feed 8-12  times, every 24 hours. YOU MAY NEED TO WAKE YOUR BABY UP TO FEED  · If breastfeeding, offer both breasts when your baby is showing feeding cues, such as rooting or bringing hand to mouth and sucking  · Common for  babies to feed every 1-3 hours   · Only allow baby to sleep up to 4 hours in between feeds if baby is feeding well at each feed. Offer breast anytime baby is showing feeding cues and at least every 3 hours  · Follow up with outpatient [facility] for continued breast feeding support    FORMULA FEEDING  · Feed baby formula every 2-3 hours when your baby is showing feeding cues  · Paced bottle feeding will help baby not over eat at each feed     BOTTLE FEEDING   · Paced Bottle Feeding is a method of bottle feeding that allows the infant to be more in control of the feeding pace. This feeding method slows down the flow of milk into the nipple and the mouth, allowing the baby to eat more slowly, and take breaks. Paced feeding reduces the risk of overfeeding that may result in discomfort for the baby   · Hold baby almost upright or slightly reclined position supporting the head and neck  · Use a small nipple for slow-flowing. Slow flow nipple holes help in controlling flow   · Don't force the bottle's nipple into your baby's mouth. Tickle babies lip so baby opens their mouth  · Insert nipple and hold the bottle flat  · Let the baby suck three to four times without milk then tip the bottle just enough to fill the nipple about correction with milk  · Let baby suck 3-5 continuous swallows, about 20-30 seconds tip the bottle down to give the baby a break  · After a few seconds, when the baby begins to suck again, tip bottle up to allow milk to flow into the nipple  · Continue to Pace feed until baby shows signs of fullness; no longer sucking after a break, turning away or pushing away the nipple   · Bottle propping is not a recommended practice for feeding  · Bottle propping is when you give a baby  "a bottle by leaning the bottle against a pillow, or other support, rather than holding the baby and the bottle.  · Forces your baby to keep up with the flow, even if the baby is full   · This can increase your baby's risk of choking, ear infections, and tooth decay    BOTTLE PREPARATION   · Never feed  formula to your baby, or use formula if the container is dented  · When using ready-to-feed, shake formula containers before opening  · If formula is in a can, clean the lid of any dust, and be sure the can opener is clean  · Formula does not need to be warmed. If you choose to feed warmed formula, do not microwave it. This can cause \"hot spots\" that could burn your baby. Instead, set the filled bottle in a bowl of warm (not boiling) water or hold the bottle under warm tap water. Sprinkle a few drops of formula on the inside of your wrist to make sure it's not too hot  · Measure and pour desired amount of water into baby bottle  · Add unpacked, level scoop(s) of powder to the bottle as directed on formula container. Return dry scoop to can  · Put the cap on the bottle and shake. Move your wrist in a twisting motion helps powder formula mix more quickly and more thoroughly  · Feed or store immediately in refrigerator  · You need to sterilize bottles, nipples, rings, etc., only before the first use    CLEANING BOTTLE  · Use hot, soapy water  · Rinse the bottles and attachments separately and clean with a bottle brush  · If your bottles are labelled  safe, you can alternatively go ahead and wash them in the    · After washing, rinse the bottle parts thoroughly in hot running water to remove any bubbles or soap residue   · Place the parts on a bottle drying rack   · Make sure the bottles are left to drain in a well-ventilated location to ensure that they dry thoroughly  CAR SEAT  For your baby's safety and to comply with [place] State Law you will need to bring a car seat to the hospital before " taking your baby home. Please read your car seat instructions before your baby's discharge from the hospital.  · Make sure you place an emergency contact sticker on your baby's car seat with your baby's identifying information  · Car seat should not be placed in the front seat of a vehicle. The car seat should be placed in the back seat in the rear-facing position.  · Car seat information is available through Car Seat Safety Station at 724-7840 and also at [email or web address]    MATERNAL CARE     WOUND CARE  Ask your physician for additional care instructions. In general:  ·  Incision:  · May shower and pat incision dry   · Keep the incision clean and dry  · There should not be any opening or pus from the incision  · Continue to walk at home 3 times a day   · Do NOT lift anything heavier than your baby (over 10 pounds)  · Encourage family to help participate in care of the  to allow rest and mom time to heal    · Episiotomy/Laceration  · May use shana-spray bottle, witch hazel pads and dermaplast spray for comfort  · Use shana-spray bottle after urinating to cleanse perineal area  · To prevent burning during urination spray shana-water bottle on labial area   · Pat perineal area dry until episiotomy/laceration is healed  · Continue to use shana-bottle until bleeding stops as needed  · If have a 2nd degree laceration or greater, a Sitz bath can offer relief from soreness, burning, and inflammation   · Sitz Bath   · Sit in 6 inches of warm water and soak laceration as needed until the laceration heals    VAGINAL CARE AND BLEEDING  · Nothing inside vagina for 6 weeks:   · No sexual intercourse, tampons or douching  · Bleeding may continue for 2-4 weeks. Amount and color may vary  · Soaking 1 pad or more in an hour for several hours is considered heavy bleeding  · Passing large egg sized blood clots can be concerning  · If you feel like you have heavy bleeding or are having increasing amount of blood clots  "call your Obstetrician immediately  · If you begin feeling faint upon standing, feeling sick to your stomach, have clammy skin, a really fast heartbeat, have chills, start feeling confused, dizzy, sleepy or weak, or feeling like you're going to faint call your Obstetrician immediately    HYPERTENSION   Preeclampsia or gestational hypertension are types of high blood pressure that only pregnant women can get. It is important for you to be aware of symptoms to seek early intervention and treatment. If you have any of these symptoms immediately call your Obstetrician    · Vision changes or blurred vision   · Severe headache or pain that is unrelieved with medication and will not go away  · Persistent pain in upper abdomen or shoulder   · Increased swelling of face, feet, or hands  · Difficulty breathing or shortness of breath at rest  · Urinating less than usual    URINATION AND BOWEL MOVEMENTS  · Eating more fiber (bran cereal, fruits, and vegetables) and drinking plenty of fluids will help to avoid constipation  · Urinary frequency and urgency after childbirth is normal  · If you experience any urinary pain, burning or frequency call your provider    BIRTH CONTROL  · It is possible to become pregnant at any time after delivery and while breastfeeding  · Plan to discuss a method of birth control with your physician at your post-delivery follow up visit    POSTPARTUM BLUES  During the first few days after birth, you may experience a sense of the \"blues\" which may include impatience, irritability or even crying. These feelings come and go quickly. However, as many as 1 in 10 women experience emotional symptoms known as postpartum depression.     POSTPARTUM DEPRESSION    May start as early as the second or third day after delivery or take several weeks or months to develop. Symptoms of \"blues\" are present, but are more intense: Crying spells; loss of appetite; feelings of hopelessness or loss of control; fear of touching " "the baby; over concern or no concern at all about the baby; little or no concern about your own appearance/caring for yourself; and/or inability to sleep or excessive sleeping. Contact your Obstetrician if you are experiencing any of these symptoms     PREVENTING SHAKEN BABY  If you are angry or stressed, PUT THE BABY IN THE CRIB, step away, take some deep breaths, and wait until you are calm to care for the baby. DO NOT SHAKE THE BABY. You are not alone, call a supporter for help.  · Crisis Call Center 24/7 crisis call line (137-555-8666) or (1-797.936.2382)  · You can also text them, text \"ANSWER\" (804626)      "

## 2021-03-15 ENCOUNTER — TELEPHONE (OUTPATIENT)
Dept: OBGYN | Facility: CLINIC | Age: 31
End: 2021-03-15

## 2021-03-15 NOTE — TELEPHONE ENCOUNTER
Holly DAVIS from [facility] called and wanted to know the medical decision of why patient started leave 01/24/21. Looked thru patient's chart had serve IUGR that had resolved before delivery.

## 2021-03-17 ENCOUNTER — POST PARTUM (OUTPATIENT)
Dept: OBGYN | Facility: CLINIC | Age: 31
End: 2021-03-17
Payer: COMMERCIAL

## 2021-03-17 VITALS — BODY MASS INDEX: 30.29 KG/M2 | WEIGHT: 182 LBS | SYSTOLIC BLOOD PRESSURE: 135 MMHG | DIASTOLIC BLOOD PRESSURE: 66 MMHG

## 2021-03-17 PROCEDURE — 99024 POSTOP FOLLOW-UP VISIT: CPT | Performed by: OBSTETRICS & GYNECOLOGY

## 2021-03-17 ASSESSMENT — EDINBURGH POSTNATAL DEPRESSION SCALE (EPDS)
I HAVE BEEN SO UNHAPPY THAT I HAVE BEEN CRYING: ONLY OCCASIONALLY
I HAVE BEEN ABLE TO LAUGH AND SEE THE FUNNY SIDE OF THINGS: AS MUCH AS I ALWAYS COULD
I HAVE LOOKED FORWARD WITH ENJOYMENT TO THINGS: AS MUCH AS I EVER DID
THE THOUGHT OF HARMING MYSELF HAS OCCURRED TO ME: HARDLY EVER
THINGS HAVE BEEN GETTING ON TOP OF ME: NO, MOST OF THE TIME I HAVE COPED QUITE WELL
I HAVE BEEN SO UNHAPPY THAT I HAVE HAD DIFFICULTY SLEEPING: NOT VERY OFTEN
I HAVE BEEN ANXIOUS OR WORRIED FOR NO GOOD REASON: YES, SOMETIMES
I HAVE FELT SCARED OR PANICKY FOR NO GOOD REASON: YES, SOMETIMES
I HAVE FELT SAD OR MISERABLE: NOT VERY OFTEN
TOTAL SCORE: 12
I HAVE BLAMED MYSELF UNNECESSARILY WHEN THINGS WENT WRONG: YES, MOST OF THE TIME

## 2021-03-17 ASSESSMENT — FIBROSIS 4 INDEX: FIB4 SCORE: 0.64

## 2021-03-17 NOTE — PROGRESS NOTES
Pt here today for postpartum exam.   Operation Date:03/07/21  Currently: Breast and bottle feeding   BCM: BTL   Good ph:[phone]

## 2021-03-17 NOTE — PROGRESS NOTES
Subjective:      Shakila Salgado is a 30 y.o. female who presents for postop exam            HPI patient is a 30-year-old G2, P2 who presents today for postop exam status post repeat  and sterilization.  She is doing well currently.  Pain is controlled with ibuprofen.  Reports normal bowel and bladder functions.  Denies depression symptoms.  Denies headaches fevers or dysuria.  Patient states she has had some postpartum blues symptoms and she cried a few times and baby was fussy but otherwise she is coping well and has good support.  She is breast pumping and feeding baby.  Has appointment with pediatrician tomorrow for baby.    ROS all organ systems were reviewed and were negative except for complaints in HPI       Objective:     /66   Wt 82.6 kg (182 lb)   LMP 2020   BMI 30.29 kg/m²      Physical Exam  Vitals and nursing note reviewed. Exam conducted with a chaperone present.   Constitutional:       General: She is not in acute distress.     Appearance: Normal appearance. She is not toxic-appearing.   HENT:      Head: Normocephalic and atraumatic.   Eyes:      General: No scleral icterus.        Right eye: No discharge.         Left eye: No discharge.      Conjunctiva/sclera: Conjunctivae normal.   Cardiovascular:      Rate and Rhythm: Normal rate and regular rhythm.      Pulses: Normal pulses.      Heart sounds: Normal heart sounds. No murmur. No gallop.    Pulmonary:      Effort: Pulmonary effort is normal. No respiratory distress.      Breath sounds: Normal breath sounds. No wheezing.   Abdominal:      General: Abdomen is flat. Bowel sounds are normal. There is no distension.      Palpations: Abdomen is soft.      Tenderness: There is no abdominal tenderness.      Comments: Incision is healed and nontender   Musculoskeletal:         General: No swelling or tenderness. Normal range of motion.      Cervical back: Normal range of motion and neck supple. No rigidity.   Lymphadenopathy:       Cervical: No cervical adenopathy.   Skin:     General: Skin is warm and dry.      Coloration: Skin is not jaundiced.   Neurological:      General: No focal deficit present.      Mental Status: She is alert and oriented to person, place, and time.      Gait: Gait normal.   Psychiatric:         Mood and Affect: Mood normal.         Behavior: Behavior normal.         Thought Content: Thought content normal.         Judgment: Judgment normal.          Discussion:    EPDS score was 12 and I reviewed this with patient but she change some of the screening answers after counseling.  She has some blue symptoms but no definite sign of depression.  Patient is coping well overall and has good support.  We discussed depression and possible symptoms that needs evaluation.  Patient will call us if she has any unusual symptoms.       Assessment/Plan:        1. Postpartum examination following  delivery  Patient is a 30-year-old G2, P2 here for postop exam status post  with sterilization postoperative day #10.  Patient is doing well overall  Incision is healing well  Has appointment tomorrow with pediatrician  Patient was advised to continue postoperative restrictions  She will follow up in 3 weeks for postpartum exam and will call us with any unusual symptoms or concerns.

## 2021-05-04 ENCOUNTER — TELEPHONE (OUTPATIENT)
Dept: OBGYN | Facility: CLINIC | Age: 31
End: 2021-05-04

## 2021-05-04 NOTE — TELEPHONE ENCOUNTER
Returned pt's phone call. Pt stated she needs a return to work letter. (pt had repeat C/setion on 03/07/2021 and had her C/Section appt on 03/17/2021 with Dr. Kim) Informed pt that on Dr. Kim's note stated pt need to return for her Postpartum visit which pt never completed or scheduled. Explained to pt that her Postpartum appt is the visit when the provider evaluates her and determines if she is cleared to go back to work.     Pt stated she lives in [place] and is very hard to get here. Stated she is feeling perfectly fine. Pt denies any incision pain or signs of infection. Denies any depression symptoms. Pt stated she is at work but they're not allowing her to go in do to she needs that clearance letter. Stated she can loose her job and has been very broke. Pt asked if I could please go talk to a provider. and explainded her situation. I told pt that I was going to consult with one of the doctors and will call her back. With an answer.     I consulted with Dr. Segura and informed him the above information. Per Dr. Segura Pt it is okayed to get the clearance letter to go back to work today 05/04/2021.     I called pt and notified her that Dr. Segura approved the the return to work letter and is available through [employer]. Pt was very grateful and apologized for not completing her PP appt. Pt had no further questions.

## (undated) DEVICE — PACK ROOM TURNOVER L&D (12/CA)

## (undated) DEVICE — TAPE CLOTH MEDIPORE 6 INCH - (12RL/CA)

## (undated) DEVICE — DETERGENT RENUZYME PLUS 10 OZ PACKET (50/BX)

## (undated) DEVICE — STAPLER SKIN DISP - (6/BX 10BX/CA) VISISTAT

## (undated) DEVICE — PACK C-SECTION (2EA/CA)

## (undated) DEVICE — LIGASURE SM JAW SEALER CRVD - (6EA/CA)

## (undated) DEVICE — PLUMEPEN ULTRA 3/8 IN X 10 FT HOSE (20EA/CA)

## (undated) DEVICE — SUTUREABS02-0 CT1 27IN - (36EA/BX)

## (undated) DEVICE — SLEEVE, SEQUENTIAL CALF REG

## (undated) DEVICE — TUBING CLEARLINK DUO-VENT - C-FLO (48EA/CA)

## (undated) DEVICE — PAD LAP STERILE 18 X 18 - (5/PK 40PK/CA)

## (undated) DEVICE — CHLORAPREP 26 ML APPLICATOR - ORANGE TINT(25/CA)

## (undated) DEVICE — SODIUM CHL IRRIGATION 0.9% 1000ML (12EA/CA)

## (undated) DEVICE — SUTURE 0 GUT-PLAIN (36PK/BX)

## (undated) DEVICE — CANISTER SUCTION 3000ML MECHANICAL FILTER AUTO SHUTOFF MEDI-VAC NONSTERILE LF DISP  (40EA/CA)

## (undated) DEVICE — CATHETER IV NON-SAFETY 18 GA X 1 1/4 (50/BX 4BX/CA)

## (undated) DEVICE — GLOVE BIOGEL INDICATOR SZ 6 SURGICAL PF LTX -(50/BX)

## (undated) DEVICE — WATER IRRIGATION STERILE 1000ML (12EA/CA)

## (undated) DEVICE — HEAD HOLDER JUNIOR/ADULT

## (undated) DEVICE — BLANKET UNDERBODY FULL ACCES - (5/CA)

## (undated) DEVICE — GLOVE BIOGEL SZ 6 PF LATEX - (50EA/BX 4BX/CA)

## (undated) DEVICE — SET EXTENSION WITH 2 PORTS (48EA/CA) ***PART #2C8610 IS A SUBSTITUTE*****

## (undated) DEVICE — SUTURE 0 VICRYL PLUS CT-1 - 36 INCH (36/BX)

## (undated) DEVICE — PAD GROUNDING PRE-JELLED - (50EA/PK)

## (undated) DEVICE — TRAY SPINAL ANESTHESIA NON-SAFETY (10/CA)

## (undated) DEVICE — KIT  I.V. START (100EA/CA)